# Patient Record
Sex: MALE | Race: WHITE | NOT HISPANIC OR LATINO | URBAN - METROPOLITAN AREA
[De-identification: names, ages, dates, MRNs, and addresses within clinical notes are randomized per-mention and may not be internally consistent; named-entity substitution may affect disease eponyms.]

---

## 2018-12-06 ENCOUNTER — TRANSFERRED RECORDS (OUTPATIENT)
Dept: HEALTH INFORMATION MANAGEMENT | Facility: CLINIC | Age: 28
End: 2018-12-06

## 2018-12-06 DIAGNOSIS — H33.059: Primary | ICD-10-CM

## 2018-12-08 ENCOUNTER — ANESTHESIA EVENT (OUTPATIENT)
Dept: SURGERY | Facility: AMBULATORY SURGERY CENTER | Age: 28
End: 2018-12-08

## 2018-12-09 PROBLEM — H33.001 RHEGMATOGENOUS RETINAL DETACHMENT OF RIGHT EYE: Status: ACTIVE | Noted: 2018-12-09

## 2018-12-10 ENCOUNTER — ANESTHESIA (OUTPATIENT)
Dept: SURGERY | Facility: AMBULATORY SURGERY CENTER | Age: 28
End: 2018-12-10

## 2018-12-10 ENCOUNTER — HOSPITAL ENCOUNTER (OUTPATIENT)
Facility: AMBULATORY SURGERY CENTER | Age: 28
End: 2018-12-10
Attending: OPHTHALMOLOGY

## 2018-12-10 VITALS
WEIGHT: 177.47 LBS | TEMPERATURE: 98.1 F | RESPIRATION RATE: 18 BRPM | OXYGEN SATURATION: 97 % | HEART RATE: 52 BPM | SYSTOLIC BLOOD PRESSURE: 112 MMHG | BODY MASS INDEX: 24.04 KG/M2 | HEIGHT: 72 IN | DIASTOLIC BLOOD PRESSURE: 50 MMHG

## 2018-12-10 DIAGNOSIS — H33.001 RHEGMATOGENOUS RETINAL DETACHMENT OF RIGHT EYE: ICD-10-CM

## 2018-12-10 DIAGNOSIS — Z86.69 H/O RETINAL DETACHMENT: Primary | ICD-10-CM

## 2018-12-10 DEVICE — EYE IMP BAND 2.5MM STYLE 240 S2987: Type: IMPLANTABLE DEVICE | Site: EYE | Status: FUNCTIONAL

## 2018-12-10 DEVICE — IMPLANTABLE DEVICE: Type: IMPLANTABLE DEVICE | Site: EYE | Status: FUNCTIONAL

## 2018-12-10 RX ORDER — ONDANSETRON 2 MG/ML
4 INJECTION INTRAMUSCULAR; INTRAVENOUS EVERY 30 MIN PRN
Status: DISCONTINUED | OUTPATIENT
Start: 2018-12-10 | End: 2018-12-11 | Stop reason: HOSPADM

## 2018-12-10 RX ORDER — PHENYLEPHRINE HYDROCHLORIDE 25 MG/ML
1 SOLUTION/ DROPS OPHTHALMIC
Status: COMPLETED | OUTPATIENT
Start: 2018-12-10 | End: 2018-12-10

## 2018-12-10 RX ORDER — ATROPINE SULFATE 10 MG/ML
SOLUTION/ DROPS OPHTHALMIC PRN
Status: DISCONTINUED | OUTPATIENT
Start: 2018-12-10 | End: 2018-12-10 | Stop reason: HOSPADM

## 2018-12-10 RX ORDER — PROPOFOL 10 MG/ML
INJECTION, EMULSION INTRAVENOUS PRN
Status: DISCONTINUED | OUTPATIENT
Start: 2018-12-10 | End: 2018-12-10

## 2018-12-10 RX ORDER — PROPARACAINE HYDROCHLORIDE 5 MG/ML
1 SOLUTION/ DROPS OPHTHALMIC ONCE
Status: COMPLETED | OUTPATIENT
Start: 2018-12-10 | End: 2018-12-10

## 2018-12-10 RX ORDER — SODIUM CHLORIDE, SODIUM LACTATE, POTASSIUM CHLORIDE, CALCIUM CHLORIDE 600; 310; 30; 20 MG/100ML; MG/100ML; MG/100ML; MG/100ML
INJECTION, SOLUTION INTRAVENOUS CONTINUOUS
Status: DISCONTINUED | OUTPATIENT
Start: 2018-12-10 | End: 2018-12-11 | Stop reason: HOSPADM

## 2018-12-10 RX ORDER — GABAPENTIN 300 MG/1
300 CAPSULE ORAL ONCE
Status: COMPLETED | OUTPATIENT
Start: 2018-12-10 | End: 2018-12-10

## 2018-12-10 RX ORDER — DEXAMETHASONE SODIUM PHOSPHATE 4 MG/ML
INJECTION, SOLUTION INTRA-ARTICULAR; INTRALESIONAL; INTRAMUSCULAR; INTRAVENOUS; SOFT TISSUE PRN
Status: DISCONTINUED | OUTPATIENT
Start: 2018-12-10 | End: 2018-12-10

## 2018-12-10 RX ORDER — MEPERIDINE HYDROCHLORIDE 25 MG/ML
12.5 INJECTION INTRAMUSCULAR; INTRAVENOUS; SUBCUTANEOUS
Status: DISCONTINUED | OUTPATIENT
Start: 2018-12-10 | End: 2018-12-11 | Stop reason: HOSPADM

## 2018-12-10 RX ORDER — DEXAMETHASONE SODIUM PHOSPHATE 4 MG/ML
INJECTION, SOLUTION INTRA-ARTICULAR; INTRALESIONAL; INTRAMUSCULAR; INTRAVENOUS; SOFT TISSUE PRN
Status: DISCONTINUED | OUTPATIENT
Start: 2018-12-10 | End: 2018-12-10 | Stop reason: HOSPADM

## 2018-12-10 RX ORDER — CYCLOPENTOLATE HYDROCHLORIDE 10 MG/ML
1 SOLUTION/ DROPS OPHTHALMIC
Status: DISCONTINUED | OUTPATIENT
Start: 2018-12-10 | End: 2018-12-10 | Stop reason: HOSPADM

## 2018-12-10 RX ORDER — NEOMYCIN POLYMYXIN B SULFATES AND DEXAMETHASONE 3.5; 10000; 1 MG/ML; [USP'U]/ML; MG/ML
1 SUSPENSION/ DROPS OPHTHALMIC 4 TIMES DAILY
Qty: 5 ML | Refills: 0 | Status: SHIPPED | OUTPATIENT
Start: 2018-12-10 | End: 2019-08-01

## 2018-12-10 RX ORDER — ONDANSETRON 4 MG/1
4 TABLET, ORALLY DISINTEGRATING ORAL EVERY 30 MIN PRN
Status: DISCONTINUED | OUTPATIENT
Start: 2018-12-10 | End: 2018-12-11 | Stop reason: HOSPADM

## 2018-12-10 RX ORDER — TROPICAMIDE 10 MG/ML
1 SOLUTION/ DROPS OPHTHALMIC
Status: COMPLETED | OUTPATIENT
Start: 2018-12-10 | End: 2018-12-10

## 2018-12-10 RX ORDER — TROPICAMIDE 10 MG/ML
1 SOLUTION/ DROPS OPHTHALMIC
Status: DISCONTINUED | OUTPATIENT
Start: 2018-12-10 | End: 2018-12-10 | Stop reason: HOSPADM

## 2018-12-10 RX ORDER — SODIUM CHLORIDE, SODIUM LACTATE, POTASSIUM CHLORIDE, CALCIUM CHLORIDE 600; 310; 30; 20 MG/100ML; MG/100ML; MG/100ML; MG/100ML
INJECTION, SOLUTION INTRAVENOUS CONTINUOUS
Status: DISCONTINUED | OUTPATIENT
Start: 2018-12-10 | End: 2018-12-10 | Stop reason: HOSPADM

## 2018-12-10 RX ORDER — CYCLOPENTOLATE HYDROCHLORIDE 10 MG/ML
1 SOLUTION/ DROPS OPHTHALMIC
Status: COMPLETED | OUTPATIENT
Start: 2018-12-10 | End: 2018-12-10

## 2018-12-10 RX ORDER — PROPOFOL 10 MG/ML
INJECTION, EMULSION INTRAVENOUS CONTINUOUS PRN
Status: DISCONTINUED | OUTPATIENT
Start: 2018-12-10 | End: 2018-12-10

## 2018-12-10 RX ORDER — EPHEDRINE SULFATE 50 MG/ML
INJECTION, SOLUTION INTRAMUSCULAR; INTRAVENOUS; SUBCUTANEOUS PRN
Status: DISCONTINUED | OUTPATIENT
Start: 2018-12-10 | End: 2018-12-10

## 2018-12-10 RX ORDER — POLYMYXIN B SULFATE AND TRIMETHOPRIM 1; 10000 MG/ML; [USP'U]/ML
SOLUTION OPHTHALMIC PRN
Status: DISCONTINUED | OUTPATIENT
Start: 2018-12-10 | End: 2018-12-10 | Stop reason: HOSPADM

## 2018-12-10 RX ORDER — FENTANYL CITRATE 50 UG/ML
INJECTION, SOLUTION INTRAMUSCULAR; INTRAVENOUS PRN
Status: DISCONTINUED | OUTPATIENT
Start: 2018-12-10 | End: 2018-12-10

## 2018-12-10 RX ORDER — BALANCED SALT SOLUTION 6.4; .75; .48; .3; 3.9; 1.7 MG/ML; MG/ML; MG/ML; MG/ML; MG/ML; MG/ML
SOLUTION OPHTHALMIC PRN
Status: DISCONTINUED | OUTPATIENT
Start: 2018-12-10 | End: 2018-12-10 | Stop reason: HOSPADM

## 2018-12-10 RX ORDER — PHENYLEPHRINE HYDROCHLORIDE 25 MG/ML
1 SOLUTION/ DROPS OPHTHALMIC
Status: DISCONTINUED | OUTPATIENT
Start: 2018-12-10 | End: 2018-12-10 | Stop reason: HOSPADM

## 2018-12-10 RX ORDER — OXYCODONE HYDROCHLORIDE 5 MG/1
5 TABLET ORAL EVERY 4 HOURS PRN
Status: DISCONTINUED | OUTPATIENT
Start: 2018-12-10 | End: 2018-12-11 | Stop reason: HOSPADM

## 2018-12-10 RX ORDER — ONDANSETRON 2 MG/ML
INJECTION INTRAMUSCULAR; INTRAVENOUS PRN
Status: DISCONTINUED | OUTPATIENT
Start: 2018-12-10 | End: 2018-12-10

## 2018-12-10 RX ORDER — LIDOCAINE HYDROCHLORIDE 20 MG/ML
INJECTION, SOLUTION INFILTRATION; PERINEURAL PRN
Status: DISCONTINUED | OUTPATIENT
Start: 2018-12-10 | End: 2018-12-10

## 2018-12-10 RX ORDER — ACETAMINOPHEN 325 MG/1
975 TABLET ORAL ONCE
Status: COMPLETED | OUTPATIENT
Start: 2018-12-10 | End: 2018-12-10

## 2018-12-10 RX ORDER — FENTANYL CITRATE 50 UG/ML
25-50 INJECTION, SOLUTION INTRAMUSCULAR; INTRAVENOUS
Status: DISCONTINUED | OUTPATIENT
Start: 2018-12-10 | End: 2018-12-10 | Stop reason: HOSPADM

## 2018-12-10 RX ORDER — NALOXONE HYDROCHLORIDE 0.4 MG/ML
.1-.4 INJECTION, SOLUTION INTRAMUSCULAR; INTRAVENOUS; SUBCUTANEOUS
Status: DISCONTINUED | OUTPATIENT
Start: 2018-12-10 | End: 2018-12-11 | Stop reason: HOSPADM

## 2018-12-10 RX ORDER — HYDROMORPHONE HYDROCHLORIDE 1 MG/ML
.3-.5 INJECTION, SOLUTION INTRAMUSCULAR; INTRAVENOUS; SUBCUTANEOUS EVERY 10 MIN PRN
Status: DISCONTINUED | OUTPATIENT
Start: 2018-12-10 | End: 2018-12-11 | Stop reason: HOSPADM

## 2018-12-10 RX ADMIN — PHENYLEPHRINE HYDROCHLORIDE 1 DROP: 25 SOLUTION/ DROPS OPHTHALMIC at 08:20

## 2018-12-10 RX ADMIN — SODIUM CHLORIDE, SODIUM LACTATE, POTASSIUM CHLORIDE, CALCIUM CHLORIDE: 600; 310; 30; 20 INJECTION, SOLUTION INTRAVENOUS at 09:59

## 2018-12-10 RX ADMIN — TROPICAMIDE 1 DROP: 10 SOLUTION/ DROPS OPHTHALMIC at 08:40

## 2018-12-10 RX ADMIN — DEXAMETHASONE SODIUM PHOSPHATE 4 MG: 4 INJECTION, SOLUTION INTRA-ARTICULAR; INTRALESIONAL; INTRAMUSCULAR; INTRAVENOUS; SOFT TISSUE at 10:10

## 2018-12-10 RX ADMIN — PHENYLEPHRINE HYDROCHLORIDE 1 DROP: 25 SOLUTION/ DROPS OPHTHALMIC at 08:40

## 2018-12-10 RX ADMIN — PROPOFOL 200 MCG/KG/MIN: 10 INJECTION, EMULSION INTRAVENOUS at 10:06

## 2018-12-10 RX ADMIN — PHENYLEPHRINE HYDROCHLORIDE 1 DROP: 25 SOLUTION/ DROPS OPHTHALMIC at 08:28

## 2018-12-10 RX ADMIN — CYCLOPENTOLATE HYDROCHLORIDE 1 DROP: 10 SOLUTION/ DROPS OPHTHALMIC at 08:27

## 2018-12-10 RX ADMIN — PROPARACAINE HYDROCHLORIDE 1 DROP: 5 SOLUTION/ DROPS OPHTHALMIC at 08:20

## 2018-12-10 RX ADMIN — ONDANSETRON 4 MG: 2 INJECTION INTRAMUSCULAR; INTRAVENOUS at 10:10

## 2018-12-10 RX ADMIN — PROPOFOL 200 MG: 10 INJECTION, EMULSION INTRAVENOUS at 10:06

## 2018-12-10 RX ADMIN — GABAPENTIN 300 MG: 300 CAPSULE ORAL at 08:32

## 2018-12-10 RX ADMIN — SODIUM CHLORIDE, SODIUM LACTATE, POTASSIUM CHLORIDE, CALCIUM CHLORIDE: 600; 310; 30; 20 INJECTION, SOLUTION INTRAVENOUS at 12:40

## 2018-12-10 RX ADMIN — LIDOCAINE HYDROCHLORIDE 80 MG: 20 INJECTION, SOLUTION INFILTRATION; PERINEURAL at 10:06

## 2018-12-10 RX ADMIN — SODIUM CHLORIDE, SODIUM LACTATE, POTASSIUM CHLORIDE, CALCIUM CHLORIDE: 600; 310; 30; 20 INJECTION, SOLUTION INTRAVENOUS at 08:32

## 2018-12-10 RX ADMIN — PHENYLEPHRINE HYDROCHLORIDE 1 DROP: 25 SOLUTION/ DROPS OPHTHALMIC at 08:30

## 2018-12-10 RX ADMIN — TROPICAMIDE 1 DROP: 10 SOLUTION/ DROPS OPHTHALMIC at 08:20

## 2018-12-10 RX ADMIN — CYCLOPENTOLATE HYDROCHLORIDE 1 DROP: 10 SOLUTION/ DROPS OPHTHALMIC at 08:20

## 2018-12-10 RX ADMIN — FENTANYL CITRATE 50 MCG: 50 INJECTION, SOLUTION INTRAMUSCULAR; INTRAVENOUS at 10:25

## 2018-12-10 RX ADMIN — CYCLOPENTOLATE HYDROCHLORIDE 1 DROP: 10 SOLUTION/ DROPS OPHTHALMIC at 08:40

## 2018-12-10 RX ADMIN — ACETAMINOPHEN 975 MG: 325 TABLET ORAL at 08:32

## 2018-12-10 RX ADMIN — TROPICAMIDE 1 DROP: 10 SOLUTION/ DROPS OPHTHALMIC at 08:30

## 2018-12-10 RX ADMIN — EPHEDRINE SULFATE 5 MG: 50 INJECTION, SOLUTION INTRAMUSCULAR; INTRAVENOUS; SUBCUTANEOUS at 10:55

## 2018-12-10 RX ADMIN — PROPARACAINE HYDROCHLORIDE 1 DROP: 5 SOLUTION/ DROPS OPHTHALMIC at 08:00

## 2018-12-10 RX ADMIN — TROPICAMIDE 1 DROP: 10 SOLUTION/ DROPS OPHTHALMIC at 08:28

## 2018-12-10 RX ADMIN — FENTANYL CITRATE 50 MCG: 50 INJECTION, SOLUTION INTRAMUSCULAR; INTRAVENOUS at 10:03

## 2018-12-10 RX ADMIN — CYCLOPENTOLATE HYDROCHLORIDE 1 DROP: 10 SOLUTION/ DROPS OPHTHALMIC at 08:30

## 2018-12-10 ASSESSMENT — MIFFLIN-ST. JEOR: SCORE: 1826.87

## 2018-12-10 NOTE — DISCHARGE INSTRUCTIONS
[facility]  Home Care Following Anesthesia  For 24 hours after surgery:  1. Get plenty of rest.  A responsible adult must stay with you for at least 24 hours after you leave the surgery center.  2. Do not drive or use heavy equipment.  If you have weakness or tingling, don't drive or use heavy equipment until this feeling goes away.   3. Do not drink alcohol.   4. Avoid strenuous or risky activities.  Ask for help when climbing stairs.  5. You may feel lightheaded.  IF so, sit for a few minutes before standing.  Have someone help you get up.   6. If you have nausea (feel sick to your stomach): Drink only clear liquids such as apple juice, ginger ale, broth or 7-Up.  Rest may also help.  Be sure to drink enough fluids.  Move to a regular diet as you feel able.   7. You may have a slight fever.  Call the doctor if your fever is over 100 F (37.7 C) (taken under the tongue) or lasts longer than 24 hours.  8. You may have a dry mouth, a sore throat, muscle aches or trouble sleeping. These should go away after 24 hours.  9. Do not make important or legal decisions.               Tips for taking pain medications  To get the best pain relief possible, remember these points:    Take pain medications as directed, before pain becomes severe.    Pain medication can upset your stomach: taking it with food may help.    Constipation is a common side effect of pain medication. Drink plenty of  fluids.    Eat foods high in fiber. Take a stool softener if recommended by your doctor or pharmacist.    Do not drink alcohol, drive or operate machinery while taking pain medications.    Ask about other ways to control pain, such as with heat, ice or relaxation.    Tylenol/Acetaminophen Consumption  To help encourage the safe use of acetaminophen, the makers of TYLENOL  have lowered the maximum daily dose for single-ingredient Extra Strength TYLENOL  (acetaminophen) products sold in the [place] from 8  pills per day (4,000 mg) to 6 pills per day (3,000 mg). The dosing interval has also changed from 2 pills every 4-6 hours to 2 pills every 6 hours.    If you feel your pain relief is insufficient, you may take Tylenol/Acetaminophen in addition to your narcotic pain medication.     Be careful not to exceed 3,000 mg of Tylenol/Acetaminophen in a 24 hour period from all sources.    If you are taking extra strength Tylenol/acetaminophen (500 mg), the maximum dose is 6 tablets in 24 hours.    If you are taking regular strength acetaminophen (325 mg), the maximum dose is 9 tablets in 24 hours.    Call a doctor for any of the followin. Signs of infection (fever, growing tenderness at the surgery site, a large amount of drainage or bleeding, severe pain, foul-smelling drainage, redness, swelling).  2. It has been over 8 to 10 hours since surgery and you are still not able to urinate (pass water).  3. Headache for over 24 hours.  4. Numbness, tingling or weakness the day after surgery (if you had spinal anesthesia).  Your doctor is:       Dr. Nicol Chavarria, Ophthalmology: [phone]               Or dial [phone] and ask for the resident on call for:  Ophthalmology  For emergency care, call the:  [place] [facility]:  [phone] (TTY for hearing impaired: [phone])  \[facility]  [phone]  Post Operative Eye Surgery Instructions    MD Nicol Murillo MD  Will I have pain?  Some discomfort is normal and expected following surgery. The first few days after surgery you may need to use prescription pain pills. Taking Tylenol (acetaminophen) regularly may help prevent pain.  Discomfort should gradually decrease and Tylenol should be sufficient to relieve pain. A foreign body sensation in the cornea of the eye is very common and caused by sutures placed at surgery. These sutures will go away in one to two weeks. If the pain worsens, you should call the doctor.  Do  I need to wear an eye patch?  You do not need to wear an eye patch at home after the doctor has removed the patch on your first day after surgery. However, you may be more comfortable wearing a patch outside in the sun, when sleeping or napping, or in a sarina, windy environment.  How much drainage should I have?  You may expect a moderate amount of drainage for a week. Gradually the drainage should decrease. The lids can be cleaned with a clean washcloth and gentle soap or diluted baby shampoo. Wipe the eyelids gently from the nose outward. Some blood in the tears is a normal finding.  Will there be swelling?  Some swelling is normal for about a week or two after which it will gradually decrease. Applying a cool compress, using a clean washcloth for 5 - 10 minutes several times a day may reduce the swelling and make you more comfortable. People may have some swelling of both eyes, especially if face down positioning is required. The white part of the eye may appear very red or bloody for a week or two. This may get worse a few days after surgery. Though the bright red appearance can look frightening, it is a normal finding early after surgery and will resolve in a few weeks.  Will I need to use eye drops?  You will be using several different kinds of eye drops or ointment (salve) when you leave the hospital. The directions will be on each bottle or tube. The medication with the red top will keep your eye dilated and may make your eye more sensitive to light. Wearing sunglasses may help. The other medication is a combination antibiotic/steroid to prevent infection and promote healing.  Occasionally a third drop is used to control the pressure in your eye. A new bottle of artificial tears or lubricant ointment may be used along with your prescription eye drops after surgery. You will be using drops from four to eight weeks. Bring all eye medications (drops. ointments, or pills) with you  to each visit.   Always wash  your hands before putting in the eye drops. You may wish to have someone else help you. Pull down on the lower lid and squeeze one drop from the bottle being careful not to touch the dropper to your eye or eyelid. One drop is sufficient, but another may be used if the first did not go into the eye. It is often easier to put in the drops if you are reclining or lying down. Wait five (5) minutes after the first drop before using the second drop to allow the medications to absorb into the eye.  How long will it take for my vision to improve?  Your vision should gradually improve, but it may take up to six months to regain your best vision. Frequently, air or gas bubbles are injected into the eye at the time of surgery. This will blur your vision significantly at first. As the bubble becomes smaller it will cause a black line in your vision that moves as you move your head. As the bubble becomes smaller you may notice that it looks more like a bubble or that it will break up into several smaller bubbles. It will take from a few days to a few weeks for the bubble to dissolve and be replaced by clear fluid.  You may notice floaters or double vision after your surgery. These symptoms usually will decrease with time. If the double vision is bothersome patching the eye may help.  If you notice a sudden worsening in your vision call your doctor.  Are there any physical restrictions after surgery?  If an air or gas bubble was placed in the eye during surgery, you will be asked to spend most of your time (both awake and during the night) with your head in a specific position, frequently face down. As the eye heals and the bubble dissolves there will be less of a need for you to stay in that specific position. You should avoid sleeping on your back until the bubble has totally dissolved and you have been given permission from your surgeon. You should not fly in an airplane or go to high altitudes in the mountains while there is  a bubble in your eye. If you should require any other surgery, under general anesthesia, while you still have an air bubble in your eye, have your surgeon or anesthetist contact us prior to your surgery. Some anesthetic agents can make the bubble expand and seriously damage your eye.  Patients that have a gas/air bubble placed in their eye will have a green medical alert band on their wrist.  This band should not be removed until the doctor removes it for you or gives you permission to remove it.     Heavy lifting (greater than 50 pounds), swimming and contact sports should be avoided for about 3 to 4 weeks after surgery.  You may resume your usual sexual activities about one week after surgery.  When may I return to work or my normal activities?  Depending on the type or work, you may return to work within a few days. If your work involves physical activity or driving, you will need to restrict your activities and remain home longer.  You may watch television, look at magazines, or work puzzles. Reading may be uncomfortable for several days, but using the eyes will not cause any damage.  You may go outside as usual. If conditions are windy or sarina, wear an eye pad to avoid getting dust or dirt in the eye.  Can I travel?  You cannot fly in an airplane or drive into the mountains as long as the air or gas bubble remains in your eye.    Are there any driving restrictions?  Someone will need to drive you home from the hospital. Generally driving can be resumed in several days if you have good vision in your other eye. If you do not feel comfortable driving, do not drive! Your depth perception will be decreased so you will want to try driving during the day in light traffic until you feel comfortable driving. You should restrict your driving while you are taking prescription pain pills as they also can affect your judgment.  When can I shower and wash my hair?  You may shower or bathe when you get home, but avoid  getting water in your eye. You may want someone to help you shampoo your hair at first.  You may shave, brush your teeth, or comb your hair. Do not use make-up, mascara, or creams/lotions around your eye for several weeks.  When will I see the doctor again?  Generally, you will be seen the first day after surgery and again 1-2 weeks later. If you have not received a return appointment before leaving the hospital, you should call our office during the business hours to arrange an appointment. If you will be seeing your local doctor instead of us, you will need to call that office to set up an appointment.    If you have a green armband on, your physician will instruct you as to how long you will have to wear it at your post-operative follow-up appointment.  How do I reach a doctor if I have concerns?  One of our doctors is available by calling [facility] [phone]. Please try to call for routine questions and prescription refills during business hours.  You should call your doctor if:  You notice a sudden decrease in your vision.  Have severe pain or pain increases rather than subsiding.  You notice a new black curtain over your eye that is not the gas bubble. If you have any of these symptoms, you may need to be examined.

## 2018-12-10 NOTE — ANESTHESIA POSTPROCEDURE EVALUATION
Anesthesia POST Procedure Evaluation    Patient: Abel Nuñez   MRN:     [id number] Gender:   male   Age:    28 year old :      1990        Preoperative Diagnosis: Retinal Detachment   Procedure(s):  Right Eye Scleral Buckle, cryo, subretinal drainage  Left Retinopexy photocoagulation with binocular indirect ophthalmoscope   Postop Comments: No value filed.       Anesthesia Type:  General    Reportable Event: NO     PAIN: Uncomplicated   Sign Out status: Comfortable, Well controlled pain     PONV: No PONV   Sign Out status:  No Nausea or Vomiting     Neuro/Psych: Uneventful perioperative course   Sign Out Status: Preoperative baseline; Age appropriate mentation     Airway/Resp.: Uneventful perioperative course   Sign Out Status: Non labored breathing, age appropriate RR; Resp. Status within EXPECTED Parameters     CV: Uneventful perioperative course   Sign Out status: Appropriate BP and perfusion indices; Appropriate HR/Rhythm     Disposition:   Sign Out in:  PACU  Disposition:  Phase II; Home  Recovery Course: Uneventful  Follow-Up: Not required           Last Anesthesia Record Vitals:  CRNA VITALS  12/10/2018 1221 - 12/10/2018 1321      12/10/2018             Pulse:  39  (Abnormal)     SpO2:  98 %    Resp Rate (observed):  12          Last PACU/Preop Vitals:  Vitals:    12/10/18 1256 12/10/18 1311 12/10/18 1323   BP: 105/46 96/59 101/48   Pulse: 60 62 57   Resp: 24 15 22   Temp: 37.1  C (98.7  F) 37  C (98.6  F) 36.9  C (98.4  F)   SpO2: 99% 97% 97%         Electronically Signed By: Eliz Adams MD, December 10, 2018, 1:28 PM

## 2018-12-10 NOTE — OP NOTE
SURGEON: STELLA FLOREZ MD  ASSISTANT SURGEON: Haris Owen MD  PREOPERATIVE DIAGNOSIS: Retinal detachment right eye  POSTOPERATIVE DIAGNOSIS: same   NAME OF THE PROCEDURE: Right eye scleral buckle (240/276/70), Cryo, subretinal fluid drainage  Left eye indirect laser ophthalmoscopy   ANESTHESIA: General anesthesia and Retrobulbar block  COMPLICATIONS: none   INDICATIONS: Abel Nuñez is a 28 year old patient with diagnosis of inferior Retinal detachment, macula off.  DESCRIPTION OF THE PROCEDURE   The patient was brought into the OR where general anesthesia was given by the anesthesia department.     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 The decision was to proceed with prophylactic retinopexy to the peripheral lesions.     After the laser was performed then the right eye was then prepped and draped in the usual fashion for ophthalmic surgery, including the installation of one drop of povidone iodine.   A 360 degree conjunctival peritomy was created and the quadrants cleared with the quinteros scissors. The muscles were isolated and looped with 2-0 silk sutures. A 240 band previously soak in polymyxin solution was placed under each of the rectus muscles. In addition, a 276 element was placed under the band from  3 to 9 clock hours. Next 5-0 merseline horizontal mattress sutures were placed in each quadrants to secure the band. Two sutures were placed in infratemporal and infranasal quadrants. A #70 sleeve was used to secure the band in the superonasal quadrant. The superior nasal mattress sutures was left untied.   Next, the highest point of the inferior Retinal detachment was marked with ink at 6 o'clock temporal and posterior to the insertion of the inferior rectus and a sclerotomy was performed using a 69 blade. The choroid was cauterized with diathermia and the subretinal fluid was drained. Next, the sclerotomy was closed with one  single 7-0 vicryl suture.  Next, cryotherapy was applied to the inferior breaks. the scleral buckle was tighten and the eye was checked. There was good perfusion of the optic nerve. The scleral buckle had good contour and the retina was attached. There was mild posterior remaining subretinal fluid.  The pressure was checked and verified to be appropriate.   The buckle was rinsed with neosporine solution and the muscle silk sutures removed.   A peribulbar block consistent of a 1:1 mixtures of 2% Lidocaine and 0.75 % of marcaine with epinephrine and wydase was administered.  The conjunctiva was closed with 6-0 plain suture. Subconjunctival injections of ancef and dexamethasone were administered. The lid speculum was removed. The eye was cleaned with wet and dry gauze. A drop of atropine and maxitrol ointment was placed in the eye. An eye pad and dorsey shield were taped over the eye.   The patient tolerated well the procedure and was discharge to the post-operative unit in stable conditions with no complications.    The surgery was assisted by Dr. Owen. Due to the delicate and complex nature of this surgery, Dr. Owen was required. Dr. Owen assisted with scleral depression. I was present for the entire surgery.

## 2018-12-10 NOTE — ANESTHESIA PREPROCEDURE EVALUATION
Anesthesia Pre-Procedure Evaluation    Patient: Abel Nuñez   MRN:     [id number] Gender:   male   Age:    28 year old :      1990        Preoperative Diagnosis: Retinal Detachment   Procedure(s):  Right Eye 25 Vitrectomy, Fluid Gas Exchange, Laser, Scleral Buckle     Past Medical History:   Diagnosis Date     Uncomplicated asthma     When younger      History reviewed. No pertinent surgical history.       Anesthesia Evaluation     . Pt has not had prior anesthetic            ROS/MED HX    ENT/Pulmonary:     (+)asthma (as a child. No issues or meds for 2 decades. ) , . .    Neurologic:  - neg neurologic ROS     Cardiovascular:  - neg cardiovascular ROS       METS/Exercise Tolerance:  >4 METS   Hematologic:  - neg hematologic  ROS       Musculoskeletal:  - neg musculoskeletal ROS       GI/Hepatic:  - neg GI/hepatic ROS       Renal/Genitourinary:  - ROS Renal section negative       Endo:  - neg endo ROS       Psychiatric:         Infectious Disease:  - neg infectious disease ROS       Malignancy:      - no malignancy   Other:    - neg other ROS                     PHYSICAL EXAM:   Mental Status/Neuro: A/A/O   Airway: Facies: Feasible  Mallampati: I  Mouth/Opening: Full  TM distance: > 6 cm  Neck ROM: Full   Respiratory:    CV:    Comments:      Dental: Normal                  No results found for: WBC, HGB, HCT, PLT, CRP, SED, NA, POTASSIUM, CHLORIDE, CO2, BUN, CR, GLC, NIKOLE, PHOS, MAG, ALBUMIN, PROTTOTAL, ALT, AST, GGT, ALKPHOS, BILITOTAL, BILIDIRECT, LIPASE, AMYLASE, ANNETTE, PTT, INR, FIBR, TSH, T4, T3, HCG, HCGS, CKTOTAL, CKMB, TROPN    Preop Vitals  BP Readings from Last 3 Encounters:   12/10/18 126/77    Pulse Readings from Last 3 Encounters:   No data found for Pulse      Resp Readings from Last 3 Encounters:   12/10/18 16    SpO2 Readings from Last 3 Encounters:   No data found for SpO2      Temp Readings from Last 1 Encounters:   12/10/18 36.7  C (98.1  F) (Oral)    Ht Readings from Last 1 Encounters:  "  12/10/18 1.835 m (6' 0.24\")      Wt Readings from Last 1 Encounters:   12/10/18 80.5 kg (177 lb 7.5 oz)    Estimated body mass index is 23.91 kg/m  as calculated from the following:    Height as of this encounter: 1.835 m (6' 0.24\").    Weight as of this encounter: 80.5 kg (177 lb 7.5 oz).     LDA:            Assessment:   ASA SCORE: 2    NPO Status: > 2 hours since completed Clear Liquids; > 6 hours since completed Solid Foods   Documentation: H&P complete; Preop Testing complete; Consents complete   Proceeding: Proceed without further delay  Tobacco Use:  Active user of Tobacco     Plan:   Anes. Type:  General   Pre-Induction: Midazolam IV; Acetaminophen PO   Induction:  IV (Standard)   Airway: LMA   Access/Monitoring: PIV   Maintenance: Balanced   Emergence: Procedure Site   Logistics: Same Day Surgery     Postop Pain/Sedation Strategy:  Standard-Options: Opioids PRN     PONV Management:  Adult Risk Factors:, Postop Opioids     CONSENT: Direct conversation   Plan and risks discussed with: Patient; Mother          Comments for Plan/Consent:  Discussed risks, benefits, and alternatives of general anesthesia with the patient. Risks discussed included nausea/vomiting, sore throat, dental damage, soft tissue damage, problems with heart, brain, lungs, and rare allergic reactions. Patient was given a chance to ask questions. Patient consents to procedure.                            Eliz Adams MD  "

## 2018-12-10 NOTE — BRIEF OP NOTE
[facility] [place] Brief Operative Note    Pre-operative diagnosis: Retinal Detachment right eye    Post-operative diagnosis smae   Procedure: Procedure(s):  Right Eye Scleral Buckle, cryo, subretinal drainage  Left Retinopexy photocoagulation with binocular indirect ophthalmoscope    Surgeon(s): Surgeon(s) and Role:  Panel 1:     * Nicol Chavarria MD - Primary  Panel 2:     * Haris Owen MD - assistant   Estimated blood loss: Less than 0.5 cc    Specimens: * No specimens in log *   Findings: Retinal detachment  Right eye

## 2018-12-11 ENCOUNTER — OFFICE VISIT (OUTPATIENT)
Dept: OPHTHALMOLOGY | Facility: CLINIC | Age: 28
End: 2018-12-11
Attending: OPHTHALMOLOGY
Payer: COMMERCIAL

## 2018-12-11 DIAGNOSIS — Z48.810 AFTERCARE FOLLOWING SURGERY OF A SENSORY ORGAN: Primary | ICD-10-CM

## 2018-12-11 PROCEDURE — G0463 HOSPITAL OUTPT CLINIC VISIT: HCPCS | Mod: ZF

## 2018-12-11 RX ORDER — NEOMYCIN SULFATE, POLYMYXIN B SULFATE, AND DEXAMETHASONE 3.5; 10000; 1 MG/G; [USP'U]/G; MG/G
0.5 OINTMENT OPHTHALMIC AT BEDTIME
Qty: 1 TUBE | Refills: 0 | Status: SHIPPED | OUTPATIENT
Start: 2018-12-11 | End: 2018-12-11

## 2018-12-11 RX ORDER — ATROPINE SULFATE 10 MG/ML
1-2 SOLUTION/ DROPS OPHTHALMIC DAILY
Qty: 1 BOTTLE | Refills: 11 | Status: SHIPPED | OUTPATIENT
Start: 2018-12-11 | End: 2018-12-11

## 2018-12-11 RX ORDER — NEOMYCIN SULFATE, POLYMYXIN B SULFATE, AND DEXAMETHASONE 3.5; 10000; 1 MG/G; [USP'U]/G; MG/G
0.5 OINTMENT OPHTHALMIC AT BEDTIME
Qty: 1 TUBE | Refills: 0 | Status: SHIPPED | OUTPATIENT
Start: 2018-12-11 | End: 2019-08-01

## 2018-12-11 RX ORDER — ATROPINE SULFATE 10 MG/ML
1-2 SOLUTION/ DROPS OPHTHALMIC DAILY
Qty: 1 BOTTLE | Refills: 11 | Status: SHIPPED | OUTPATIENT
Start: 2018-12-11 | End: 2019-01-23

## 2018-12-11 ASSESSMENT — VISUAL ACUITY
METHOD: SNELLEN - LINEAR
OS_CC: 20/25
OS_CC+: -3
OD_SC: CF @ 3FT

## 2018-12-11 ASSESSMENT — SLIT LAMP EXAM - LIDS
COMMENTS: NORMAL
COMMENTS: NORMAL

## 2018-12-11 ASSESSMENT — EXTERNAL EXAM - RIGHT EYE: OD_EXAM: NORMAL

## 2018-12-11 ASSESSMENT — TONOMETRY
OD_IOP_MMHG: 12
IOP_METHOD: ICARE
OS_IOP_MMHG: 15

## 2018-12-11 ASSESSMENT — EXTERNAL EXAM - LEFT EYE: OS_EXAM: NORMAL

## 2018-12-11 NOTE — PATIENT INSTRUCTIONS
Position: any  No aviation  No heavy lifting   Viramontes shield at all times  Retina detachment and endophthalmitis precautions were discussed with the patient and was asked to return if any of the those occur     Medications to operative eye  maxitrol (pink top) four times a day    Maxitrol oint at bedtime  Atropine (red top) once a day      Follow up in one week

## 2018-12-11 NOTE — PROGRESS NOTES
Postoperative day 1 status post SB (240/276/70)/Cryo/SRF drainage OD and ABBE OS (12/10/18)  for RRD OD and lattice with atrophic holes OS      Right eye scleral buckle (240/276/70), Cryo, subretinal fluid drainage  Left eye indirect laser ophthalmoscopy      Slept OK  Retina attached  Doing well     Plan:  Position: any  No aviation  No heavy lifting   Viramontes shield at all times  Retina detachment and endophthalmitis precautions were discussed with the patient and was asked to return if any of the those occur     Medications to operative eye  maxitrol (pink top) four times a day    Maxitrol oint at bedtime  Atropine (red top) once a day      Follow up in one week    Haris Owen M.D.  PGY-3, Ophthalmology    ~~~~~~~~~~~~~~~~~~~~~~~~~~~~~~~~~~  I have not examined this patient. I have discussed the case and the management of this patient's care with the Resident/Fellow. I also have reviewed and agree with the assessment and plan as stated above and agree with all of its relevant components.    Nicol Chavarria MD   of Ophthalmology.  Retina Service   Department of Ophthalmology and Visual Neurosciences   [facility]  Phone: [phone]   Fax: [phone]

## 2018-12-11 NOTE — LETTER
December 11, 2018      Re: Abel Nuñez   1990    To Whom It May Concern:    This is to confirm that the above patient was seen on 12/11/2018.  Abel Nuñez is able to work, but needs to take it easy until his next appointment on Wed Dec 19th. If you have any questions, please call [place] at [phone].    Thank you for your cooperation in this matter.    Sincerely,      PRATIBHA FONTANA

## 2018-12-17 ENCOUNTER — TELEPHONE (OUTPATIENT)
Dept: OPHTHALMOLOGY | Facility: CLINIC | Age: 28
End: 2018-12-17

## 2018-12-17 NOTE — TELEPHONE ENCOUNTER
Pt called stating that he thinks the atropine is giving him a headache. Told to stop. Then pt stated he is being treated for a concussion. Told pt to check with the MD to make sure not related. Pt has an appt tomorrow with him.  Yvette Poon COT 11:34 AM December 17, 2018

## 2018-12-26 ENCOUNTER — TELEPHONE (OUTPATIENT)
Dept: OPHTHALMOLOGY | Facility: CLINIC | Age: 28
End: 2018-12-26

## 2018-12-26 NOTE — TELEPHONE ENCOUNTER
[facility] Call Center    Phone Message    May a detailed message be left on voicemail: yes    Reason for Call: Other: per pt- stated he is out of refills on the neomycin-polymyxin-dexamethasone (MAXITROL) [id number] ophthalmic medication- he stated it was a drop- but i only see ointment in his file- he would like to know if he should continue med- and if so he would like another refill thanks     Action Taken: Message routed to:  [facility] ([facility] eye clinic

## 2018-12-26 NOTE — TELEPHONE ENCOUNTER
Pt last seen one day post-op 12-11-18 and did not f/u one week post op    Note to dr. viramontes to review eye drop regimen course now 2 weeks out  Malcom Adames RN 5:32 PM 12/26/18

## 2018-12-28 NOTE — TELEPHONE ENCOUNTER
Spoke to pt about recommendations for eye drops post-op in setting of no f/u since day one post-op.    Asked pt if has had f/u post-op locally and pt had one week post-op with dr. Pinedo and has upcoming appt January 8th    Pt states also contacted dr. Pinedo's office and they did review the eye drop regimen per dr. Pinedo with pt    Pt continuing with maxitrol 4/day and ointment until f/u  Pt has stopped atropine    Reviewed may continue eye drops per Dr. Pinedo whom is following pt postoperatively   Malcom Adames RN 10:10 AM 12/28/18

## 2019-01-16 ENCOUNTER — TRANSFERRED RECORDS (OUTPATIENT)
Dept: HEALTH INFORMATION MANAGEMENT | Facility: CLINIC | Age: 29
End: 2019-01-16

## 2019-01-17 ENCOUNTER — TELEPHONE (OUTPATIENT)
Dept: OPHTHALMOLOGY | Facility: CLINIC | Age: 29
End: 2019-01-17

## 2019-01-17 NOTE — TELEPHONE ENCOUNTER
Pt seen by Dr. Pinedo yesterday for post-op retina detachment repair right eye 12-10-19  Significant subretina fluid note on exam yesterday and recommends f/u with dr. frost within one wekk    Pt started on prednisolone drops 4/day right eye  Oral prednisone 20 mg started     Scheduled pt next weds with dr. frost-- pt aware of date/time    Note to dr. Baxter/facilitator for review and review of notes being faxed  If amendment to plan advised will need to contact pt  Malcom Adames RN 8:26 AM 01/17/19

## 2019-01-23 ENCOUNTER — OFFICE VISIT (OUTPATIENT)
Dept: OPHTHALMOLOGY | Facility: CLINIC | Age: 29
End: 2019-01-23
Attending: OPHTHALMOLOGY
Payer: COMMERCIAL

## 2019-01-23 DIAGNOSIS — H33.21 RETINAL DETACHMENT WITH PRESENCE OF SUBRETINAL FLUID, RIGHT: ICD-10-CM

## 2019-01-23 DIAGNOSIS — H33.21 RETINAL DETACHMENT WITH PRESENCE OF SUBRETINAL FLUID, RIGHT: Primary | ICD-10-CM

## 2019-01-23 DIAGNOSIS — Z48.810 AFTERCARE FOLLOWING SURGERY OF A SENSORY ORGAN: ICD-10-CM

## 2019-01-23 PROCEDURE — G0463 HOSPITAL OUTPT CLINIC VISIT: HCPCS | Mod: ZF

## 2019-01-23 PROCEDURE — 92134 CPTRZ OPH DX IMG PST SGM RTA: CPT | Mod: ZF | Performed by: OPHTHALMOLOGY

## 2019-01-23 PROCEDURE — 92250 FUNDUS PHOTOGRAPHY W/I&R: CPT | Mod: ZF | Performed by: OPHTHALMOLOGY

## 2019-01-23 ASSESSMENT — VISUAL ACUITY
OD_PH_CC+: -2
OD_CC: 20/300
CORRECTION_TYPE: GLASSES
OS_CC: 20/20
METHOD: SNELLEN - LINEAR
OD_PH_CC: 20/50

## 2019-01-23 ASSESSMENT — REFRACTION_WEARINGRX
OS_SPHERE: -9.75
OS_CYLINDER: +1.25
OD_AXIS: 083
SPECS_TYPE: SVL
OD_SPHERE: -10.00
OD_CYLINDER: +2.75
OS_AXIS: 095

## 2019-01-23 ASSESSMENT — EXTERNAL EXAM - LEFT EYE: OS_EXAM: NORMAL

## 2019-01-23 ASSESSMENT — EXTERNAL EXAM - RIGHT EYE: OD_EXAM: NORMAL

## 2019-01-23 ASSESSMENT — CONF VISUAL FIELD
OD_NORMAL: 1
OS_NORMAL: 1

## 2019-01-23 ASSESSMENT — SLIT LAMP EXAM - LIDS
COMMENTS: NORMAL
COMMENTS: NORMAL

## 2019-01-23 ASSESSMENT — TONOMETRY
OD_IOP_MMHG: 24
OS_IOP_MMHG: 16
IOP_METHOD: TONOPEN

## 2019-01-23 NOTE — NURSING NOTE
Chief Complaints and History of Present Illnesses   Patient presents with     Post Op (Ophthalmology) Right Eye     Chief Complaint(s) and History of Present Illness(es)     Post Op (Ophthalmology) Right Eye     Laterality: right eye              Comments     status post SB (240/276/70)/Cryo/SRF drainage OD   Pt. States that VA has been improving RE.  No flashes or floaters RE.  No pain BE.  Vandana Atkinson COT 1:10 PM January 23, 2019

## 2019-01-23 NOTE — PROGRESS NOTES
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 Seen by Dr Pinedo and felt to have increased subretinal fluid  Right eye       Right eye scleral buckle (240/276/70), Cryo, subretinal fluid drainage  Left eye indirect laser ophthalmoscopy      Retina attached  Doing well    OCULAR IMAGING  Optical Coherence Tomography 1-23-19  Right eye: subfoveal subretinal fluid    Extramacular: shallow SRF  Left eye: good foveal contour; small cystic change    PHOTOS 1-23-19  Right eye: consistent with exam. scleral buckle good height; peripheral laser scars    Autofluorescence 01/23/19 : inferior areas of hypoautofluorescence; inf demarcation line hyperautofluorescent    Plan:  Retina detachment and endophthalmitis precautions were discussed with the patient and was asked to return if any of the those occur     Medications to operative eye  Prednisone three times a day right eye x 1 week, then twice a day x 1 week then once a day x 1 week and stop   Maxitrol oint at bedtime until finish  Atropine (red top) once a day - stop     Follow up in 4-6 weeks with Optical Coherence Tomography and inferior and temporal extramacular Optical Coherence Tomography images.   And prescription next follow up   ~~~~~~~~~~~~~~~~~~~~~~~~~~~~~~~~~~   Complete documentation of historical and exam elements from today's encounter can be found in the full encounter summary report (not reduplicated in this progress note).  I personally obtained the chief complaint(s) and history of present illness.  I confirmed and edited as necessary the review of systems, past medical/surgical history, family history, social history, and examination findings as documented by others; and I examined the patient myself.  I personally reviewed the relevant tests, images, and reports as documented above.  I formulated and edited as necessary the assessment and plan and discussed  the findings and management plan with the patient and family    Nicol Chavarria MD   of Ophthalmology.  Retina Service   Department of Ophthalmology and Visual Neurosciences   [facility]  Phone: [phone]   Fax: [phone]

## 2019-02-15 ENCOUNTER — TELEPHONE (OUTPATIENT)
Dept: OPHTHALMOLOGY | Facility: CLINIC | Age: 29
End: 2019-02-15

## 2019-02-15 NOTE — TELEPHONE ENCOUNTER
Pt calling triage line to review questions with Dr. Chavarria    S/p scleral buckle/cryo for retina detachment right eye 12-10-18    1. Ok to use tylenol per pt?--  Reviewed no contraindication per ophthalmology-- stay under 4 grams/day less if been told by PCP/healthcare profession in past. Pt states using for sinus congestion/illness      2. Pt has question if can hold on upcoming appointment with dr. Pinedo and f/u with dr. Chavarria on 3-7-19 as scheduled-- reviewed 1-23-19.  Pt to f/u in 4-6 weeks, stated if having new new vision changes/eye pain may f/u as scheduled with Dr. Chavarria on 3-7-19    Pt verbally demonstrated understanding     Note to Dr. Baxter for review and amendment if applicable  Malcom Adames RN 8:30 AM 02/15/19

## 2019-03-07 ENCOUNTER — OFFICE VISIT (OUTPATIENT)
Dept: OPHTHALMOLOGY | Facility: CLINIC | Age: 29
End: 2019-03-07
Attending: OPHTHALMOLOGY
Payer: COMMERCIAL

## 2019-03-07 DIAGNOSIS — H33.051 RECENT RETINAL DETACHMENT OF RIGHT EYE, TOTAL/SUBTOTAL: Primary | ICD-10-CM

## 2019-03-07 PROCEDURE — 92134 CPTRZ OPH DX IMG PST SGM RTA: CPT | Mod: ZF | Performed by: OPHTHALMOLOGY

## 2019-03-07 PROCEDURE — G0463 HOSPITAL OUTPT CLINIC VISIT: HCPCS | Mod: ZF

## 2019-03-07 PROCEDURE — 92015 DETERMINE REFRACTIVE STATE: CPT | Mod: ZF

## 2019-03-07 ASSESSMENT — REFRACTION_WEARINGRX
OS_CYLINDER: +1.25
OS_SPHERE: -9.75
SPECS_TYPE: SVL
OD_SPHERE: -10.00
OD_AXIS: 083
OS_AXIS: 095
OD_CYLINDER: +2.75

## 2019-03-07 ASSESSMENT — CONF VISUAL FIELD
METHOD: COUNTING FINGERS
OS_NORMAL: 1
OD_NORMAL: 1

## 2019-03-07 ASSESSMENT — VISUAL ACUITY
CORRECTION_TYPE: GLASSES
OD_PH_CC: 20/70
OD_CC: 20/250
OS_CC: 20/20
METHOD: SNELLEN - LINEAR
OS_CC+: -1

## 2019-03-07 ASSESSMENT — EXTERNAL EXAM - LEFT EYE: OS_EXAM: NORMAL

## 2019-03-07 ASSESSMENT — TONOMETRY
OS_IOP_MMHG: 20
OD_IOP_MMHG: 15
IOP_METHOD: TONOPEN

## 2019-03-07 ASSESSMENT — REFRACTION_MANIFEST
OS_CYLINDER: +1.25
OD_CYLINDER: +2.50
OS_SPHERE: -10.75
OD_SPHERE: -14.00
OD_AXIS: 095
OS_AXIS: 095

## 2019-03-07 ASSESSMENT — SLIT LAMP EXAM - LIDS
COMMENTS: NORMAL
COMMENTS: NORMAL

## 2019-03-07 ASSESSMENT — EXTERNAL EXAM - RIGHT EYE: OD_EXAM: NORMAL

## 2019-03-07 NOTE — LETTER
3/7/2019       RE: Abel Nuñez  [address]  [facility] [address]     Dear Todd,    Thank you for referring your patient, Abel Nuñez, to the EYE CLINIC at [facility]. Please see a copy of my visit note below.    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 Feels that vision is improving but slowly.    Retina attached  Doing well    OCULAR IMAGING  Optical Coherence Tomography  3-7-19  Right eye: subfoveal subretinal fluid, appears stable from 1/23   Extramacular: shallow subretinal fluid, appears to be improving  Left eye: good foveal contour; small cystic change    PHOTOS 1-23-19  Right eye: consistent with exam. scleral buckle good height; peripheral laser scars    Autofluorescence 01/23/19 : inferior areas of hypoautofluorescence; inf demarcation line hyperautofluorescent    Plan:  Off of drops  VA refracts to 20/40, dispensed today  Macular subretinal fluid without change, extramacular fluid appears to be slightly better     Follow up in 8 weeks with Optical Coherence Tomography and inferior and temporal extramacular Optical Coherence Tomography images.     Sheeba Reza MD  PGY-3 Ophthalmology  Again, thank you for allowing me to participate in the care of your patient.      Sincerely,      Nicol Chavarria MD   of Ophthalmology.  Retina Service   Department of Ophthalmology and Visual Neurosciences   [facility]  Phone: [phone]   Fax: [phone]

## 2019-03-07 NOTE — PROGRESS NOTES
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 Feels that vision is improving but slowly.    Retina attached  Doing well    OCULAR IMAGING  Optical Coherence Tomography  3-7-19  Right eye: subfoveal subretinal fluid, appears stable from 1/23   Extramacular: shallow subretinal fluid, appears to be improving  Left eye: good foveal contour; small cystic change    PHOTOS 1-23-19  Right eye: consistent with exam. scleral buckle good height; peripheral laser scars    Autofluorescence 01/23/19 : inferior areas of hypoautofluorescence; inf demarcation line hyperautofluorescent    Plan:  Off of drops  VA refracts to 20/40, dispensed today  Macular subretinal fluid without change, extramacular fluid appears to be slightly better     Follow up in 8 weeks with Optical Coherence Tomography and inferior and temporal extramacular Optical Coherence Tomography images.     Sheeba Reza MD  PGY-3 Ophthalmology    ~~~~~~~~~~~~~~~~~~~~~~~~~~~~~~~~~~   Complete documentation of historical and exam elements from today's encounter can be found in the full encounter summary report (not reduplicated in this progress note).  I personally obtained the chief complaint(s) and history of present illness.  I confirmed and edited as necessary the review of systems, past medical/surgical history, family history, social history, and examination findings as documented by others; and I examined the patient myself.  I personally reviewed the relevant tests, images, and reports as documented above.  I formulated and edited as necessary the assessment and plan and discussed the findings and management plan with the patient and family    Nicol Chavarria MD   of Ophthalmology.  Retina Service   Department of Ophthalmology and Visual Neurosciences   [facility]  Phone: [phone]   Fax: [phone]

## 2019-03-07 NOTE — NURSING NOTE
Chief Complaints and History of Present Illnesses   Patient presents with     Follow Up     Chief Complaint(s) and History of Present Illness(es)     Follow Up     Laterality: both eyes    Onset: gradual    Onset: months ago    Quality: States that the va is getting a little better     Frequency: constantly    Associated symptoms: floaters (occ) and dryness (occ).  Negative for flashes, redness and tearing    Pain scale: 0/10              Comments     Nora Barry COT 12:44 PM March 7, 2019

## 2019-03-13 ENCOUNTER — TELEPHONE (OUTPATIENT)
Dept: OPHTHALMOLOGY | Facility: CLINIC | Age: 29
End: 2019-03-13

## 2019-03-13 NOTE — TELEPHONE ENCOUNTER
Left eye strain (non-surgical eye) having more strain  Works with computer during day  Pt states at visit last week Dr. Chavarria stated eye looked fine per pt     Reviewed to try preservative free artificial tears during day, use before computer work, take eye breaks.  Pt may try to update glasses (refraction done last visit) if tears not helping  If continues to have problems may come in for evaluation or may see local eye doctor in [place]    Pt seemed comfortable with plan    Note to dr. Sahil Adames RN 8:27 AM 03/14/19              [facility] Center    Phone Message    May a detailed message be left on voicemail: yes    Reason for Call: Symptoms or Concerns     If patient has red-flag symptoms, warm transfer to triage line    Current symptom or concern: left eye has been sore recently with some strain, on and off for a while    Symptoms have been present for: unknown    Has patient previously been seen for this? Yes    By :     Date: 03/7/19    Are there any new or worsening symptoms? No      Action Taken: Message routed to:  [facility] eye

## 2019-04-04 ENCOUNTER — TELEPHONE (OUTPATIENT)
Dept: OPHTHALMOLOGY | Facility: CLINIC | Age: 29
End: 2019-04-04

## 2019-04-04 NOTE — TELEPHONE ENCOUNTER
Last glasses Rx mailed to pt per request  Also provided [employer] rep number-- pt's are able to print last glasses Rx from [employer] under results  Malcom Adames RN 9:30 AM 04/04/19

## 2019-05-08 ENCOUNTER — OFFICE VISIT (OUTPATIENT)
Dept: OPHTHALMOLOGY | Facility: CLINIC | Age: 29
End: 2019-05-08
Attending: OPHTHALMOLOGY
Payer: COMMERCIAL

## 2019-05-08 DIAGNOSIS — H33.051 RECENT RETINAL DETACHMENT OF RIGHT EYE, TOTAL/SUBTOTAL: ICD-10-CM

## 2019-05-08 PROCEDURE — G0463 HOSPITAL OUTPT CLINIC VISIT: HCPCS | Mod: ZF

## 2019-05-08 PROCEDURE — 92134 CPTRZ OPH DX IMG PST SGM RTA: CPT | Mod: ZF | Performed by: OPHTHALMOLOGY

## 2019-05-08 ASSESSMENT — VISUAL ACUITY
METHOD: SNELLEN - LINEAR
OS_CC: 20/20
OD_CC: 20/250
CORRECTION_TYPE: GLASSES
OD_PH_CC: 20/60

## 2019-05-08 ASSESSMENT — EXTERNAL EXAM - RIGHT EYE: OD_EXAM: NORMAL

## 2019-05-08 ASSESSMENT — TONOMETRY
IOP_METHOD: TONOPEN
OD_IOP_MMHG: 14
OS_IOP_MMHG: 18

## 2019-05-08 ASSESSMENT — SLIT LAMP EXAM - LIDS
COMMENTS: NORMAL
COMMENTS: NORMAL

## 2019-05-08 ASSESSMENT — CONF VISUAL FIELD
OS_NORMAL: 1
OD_NORMAL: 1

## 2019-05-08 ASSESSMENT — EXTERNAL EXAM - LEFT EYE: OS_EXAM: NORMAL

## 2019-05-08 NOTE — PROGRESS NOTES
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 Feels that vision is improving but slowly.    Retina attached  Scleral buckle good contour  Doing well  VA refracted to 20/40    OCULAR IMAGING  Optical Coherence Tomography  05/08/19   Right eye: subfoveal subretinal fluid, improved  Extramacular: shallow subretinal fluid, improving  Left eye: good foveal contour; small cystic change    PHOTOS 1-23-19  Right eye: consistent with exam. scleral buckle good height; peripheral laser scars    Autofluorescence 01/23/19 : inferior areas of hypoautofluorescence; inf demarcation line hyperautofluorescent    Assessment and plan  1. History of Retinal detachment  Right eye   status post SB (240/276/70)/Cryo/SRF drainage OD and ABBE OS (12/10/18)   Retina attached  Patient did not tolerate prescription   Macular subretinal fluid improving     plan  Follow up in 12 weeks with Optical Coherence Tomography and inferior and temporal extramacular Optical Coherence Tomography images.   And prescription   artificial tears  As needed     ~~~~~~~~~~~~~~~~~~~~~~~~~~~~~~~~~~   Complete documentation of historical and exam elements from today's encounter can be found in the full encounter summary report (not reduplicated in this progress note).  I personally obtained the chief complaint(s) and history of present illness.  I confirmed and edited as necessary the review of systems, past medical/surgical history, family history, social history, and examination findings as documented by others; and I examined the patient myself.  I personally reviewed the relevant tests, images, and reports as documented above.  I formulated and edited as necessary the assessment and plan and discussed the findings and management plan with the patient and family    Nicol Chavarria MD   of Ophthalmology.  Retina Service    Department of Ophthalmology and Visual Neurosciences   [facility]  Phone: [phone]   Fax: [phone]

## 2019-05-08 NOTE — NURSING NOTE
Chief Complaints and History of Present Illnesses   Patient presents with     Retinal Detachment Follow Up     Chief Complaint(s) and History of Present Illness(es)     Retinal Detachment Follow Up     Laterality: both eyes              Comments     Pt. States that he was seen on Monday at eye clinic and told that he had eye infection.  Was prescribed Tobramycin-dexamethasone QID BE.  No change in VA BE.  No pain BE, some itching and redness BE.  Occasional floaters RE.  Vandana Atkinson COT 1:20 PM May 8, 2019

## 2019-05-08 NOTE — LETTER
May 8, 2019      Abel Nuñez  [address] [place] [id number]        To Whom It May Concern:    Abel Nuñez was seen in our eye clinic. He is being treated for an eye condition that causes eye strain and fatigue. He will need more frequent breaks from looking at his computer screen. Please provide appropriate accomodation for his condition over the next three months. Please let us know if you have any questions.     Sincerely,        Kaleb Avila MD  Ophthalmology Department   [facility]

## 2019-07-29 DIAGNOSIS — H33.051 RECENT RETINAL DETACHMENT OF RIGHT EYE, TOTAL/SUBTOTAL: Primary | ICD-10-CM

## 2019-08-01 ENCOUNTER — OFFICE VISIT (OUTPATIENT)
Dept: OPHTHALMOLOGY | Facility: CLINIC | Age: 29
End: 2019-08-01
Attending: OPHTHALMOLOGY
Payer: COMMERCIAL

## 2019-08-01 DIAGNOSIS — H33.21 RETINAL DETACHMENT WITH PRESENCE OF SUBRETINAL FLUID, RIGHT: Primary | ICD-10-CM

## 2019-08-01 DIAGNOSIS — H33.21 RETINAL DETACHMENT WITH PRESENCE OF SUBRETINAL FLUID, RIGHT: ICD-10-CM

## 2019-08-01 PROCEDURE — G0463 HOSPITAL OUTPT CLINIC VISIT: HCPCS | Mod: ZF

## 2019-08-01 PROCEDURE — 92134 CPTRZ OPH DX IMG PST SGM RTA: CPT | Mod: ZF | Performed by: OPHTHALMOLOGY

## 2019-08-01 RX ORDER — CYCLOBENZAPRINE HCL 10 MG
10 TABLET ORAL PRN
COMMUNITY
Start: 2019-07-08

## 2019-08-01 RX ORDER — ONDANSETRON 4 MG/1
4 TABLET, FILM COATED ORAL
COMMUNITY
Start: 2019-07-31 | End: 2019-11-13

## 2019-08-01 ASSESSMENT — SLIT LAMP EXAM - LIDS
COMMENTS: NORMAL
COMMENTS: NORMAL

## 2019-08-01 ASSESSMENT — TONOMETRY
IOP_METHOD: TONOPEN
OS_IOP_MMHG: 14
OD_IOP_MMHG: 12

## 2019-08-01 ASSESSMENT — VISUAL ACUITY
METHOD: SNELLEN - LINEAR
CORRECTION_TYPE: CONTACTS
OD_CC+: -2
OS_CC: 20/20
OD_CC: 20/30
OS_CC+: -1

## 2019-08-01 ASSESSMENT — CONF VISUAL FIELD
OS_NORMAL: 1
OD_NORMAL: 1

## 2019-08-01 ASSESSMENT — ENCOUNTER SYMPTOMS: JOINT SWELLING: 1

## 2019-08-01 ASSESSMENT — EXTERNAL EXAM - RIGHT EYE: OD_EXAM: NORMAL

## 2019-08-01 ASSESSMENT — EXTERNAL EXAM - LEFT EYE: OS_EXAM: NORMAL

## 2019-08-01 NOTE — PROGRESS NOTES
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 Feels that vision is improving but slowly.    Retina attached  Scleral buckle good contour  Doing well  VA refracted to 20/40    OCULAR IMAGING  Optical Coherence Tomography  8-1-19  Right eye: subfoveal subretinal fluid, improved  Extramacular: shallow subretinal fluid, improving  Left eye: good foveal contour; small cystic change    PHOTOS 1-23-19  Right eye: consistent with exam. scleral buckle good height; peripheral laser scars    Autofluorescence 01/23/19 : inferior areas of hypoautofluorescence; inf demarcation line hyperautofluorescent    Assessment and plan  1. History of Retinal detachment  Right eye   status post SB (240/276/70)/Cryo/SRF drainage OD and ABBE OS (12/10/18)   Retina attached  Macular subretinal fluid improving     plan  Follow up in 4 months with Optical Coherence Tomography and inferior and temporal extramacular Optical Coherence Tomography images. And optos both eyes   And prescription   And CL toby   artificial tears  As needed     ~~~~~~~~~~~~~~~~~~~~~~~~~~~~~~~~~~   Complete documentation of historical and exam elements from today's encounter can be found in the full encounter summary report (not reduplicated in this progress note).  I personally obtained the chief complaint(s) and history of present illness.  I confirmed and edited as necessary the review of systems, past medical/surgical history, family history, social history, and examination findings as documented by others; and I examined the patient myself.  I personally reviewed the relevant tests, images, and reports as documented above.  I personally reviewed the ophthalmic test(s) associated with this encounter, agree with the interpretation(s) as documented by the resident/fellow, and have edited the corresponding report(s) as necessary.   I formulated and  edited as necessary the assessment and plan and discussed the findings and management plan with the patient and family    Nicol Chavarria MD   of Ophthalmology.  Retina Service   Department of Ophthalmology and Visual Neurosciences   [facility]  Phone: [phone]   Fax: [phone]

## 2019-08-01 NOTE — NURSING NOTE
Chief Complaint(s) and History of Present Illness(es)     Follow Up     In right eye.  Associated symptoms include dryness, redness and floaters.  Negative for eye pain, tearing and flashes.              Comments     Pt is here for a 3 month f/u for History of Retinal detachment  Right eye. Pt notes vision in RE is getting better. Pt notes intermittent dryness and redness BE x last few months, usually more dry and red in the evening. Pt c/o some straining of the LE since Dec after some lasering of the LE. Pt notes no changes in floaters.     Winter Aden, COMT 1:13 PM August 1, 2019

## 2019-09-16 ENCOUNTER — MYC MEDICAL ADVICE (OUTPATIENT)
Dept: OPHTHALMOLOGY | Facility: CLINIC | Age: 29
End: 2019-09-16

## 2019-09-17 PROBLEM — Z86.69 HISTORY OF RETINAL DETACHMENT: Status: ACTIVE | Noted: 2018-12-18

## 2019-09-17 PROBLEM — H52.13 MYOPIA OF BOTH EYES: Status: ACTIVE | Noted: 2019-05-03

## 2019-09-17 PROBLEM — H33.323 RETINAL HOLE OF BOTH EYES: Status: ACTIVE | Noted: 2019-01-08

## 2019-09-17 PROBLEM — H52.223 REGULAR ASTIGMATISM OF BOTH EYES: Status: ACTIVE | Noted: 2019-05-03

## 2019-09-17 PROBLEM — H35.413 LATTICE DEGENERATION OF RETINA, BILATERAL: Status: ACTIVE | Noted: 2018-12-18

## 2019-11-13 ENCOUNTER — OFFICE VISIT (OUTPATIENT)
Dept: OPHTHALMOLOGY | Facility: CLINIC | Age: 29
End: 2019-11-13
Attending: OPHTHALMOLOGY
Payer: COMMERCIAL

## 2019-11-13 ENCOUNTER — OFFICE VISIT (OUTPATIENT)
Dept: OPTOMETRY | Facility: CLINIC | Age: 29
End: 2019-11-13
Payer: COMMERCIAL

## 2019-11-13 DIAGNOSIS — H52.223 REGULAR ASTIGMATISM OF BOTH EYES: ICD-10-CM

## 2019-11-13 DIAGNOSIS — H52.13 MYOPIA, HIGH, BILATERAL: Primary | ICD-10-CM

## 2019-11-13 DIAGNOSIS — H33.051 RECENT RETINAL DETACHMENT OF RIGHT EYE, TOTAL/SUBTOTAL: ICD-10-CM

## 2019-11-13 DIAGNOSIS — H33.8 OLD RETINAL DETACHMENT, PARTIAL: Primary | ICD-10-CM

## 2019-11-13 PROCEDURE — 92250 FUNDUS PHOTOGRAPHY W/I&R: CPT | Mod: ZF | Performed by: OPHTHALMOLOGY

## 2019-11-13 PROCEDURE — 92134 CPTRZ OPH DX IMG PST SGM RTA: CPT | Mod: ZF | Performed by: OPHTHALMOLOGY

## 2019-11-13 PROCEDURE — G0463 HOSPITAL OUTPT CLINIC VISIT: HCPCS | Mod: ZF

## 2019-11-13 ASSESSMENT — REFRACTION_WEARINGRX
OS_CYLINDER: +1.25
OD_SPHERE: -14.00
OS_SPHERE: -10.75
OS_AXIS: 095
OD_CYLINDER: +2.50
OD_AXIS: 095
OS_AXIS: 095
OS_CYLINDER: +1.25
OD_AXIS: 095
OS_SPHERE: -10.75
OD_SPHERE: -14.00
OD_CYLINDER: +2.50

## 2019-11-13 ASSESSMENT — EXTERNAL EXAM - RIGHT EYE
OD_EXAM: NORMAL
OD_EXAM: NORMAL

## 2019-11-13 ASSESSMENT — CONF VISUAL FIELD
OD_NORMAL: 1
METHOD: COUNTING FINGERS
OS_NORMAL: 1

## 2019-11-13 ASSESSMENT — SLIT LAMP EXAM - LIDS
COMMENTS: NORMAL

## 2019-11-13 ASSESSMENT — REFRACTION_CURRENTRX
OD_SPHERE: -9.00
OD_DIAMETER: 14.5
OS_SPHERE: -8.00
OD_SPHERE: -9.00
OS_BRAND: ACUVUE OASYS ASTIGMATISM
OD_AXIS: 180
OD_AXIS: 010
OD_BASECURVE: 8.6
OS_BASECURVE: 8.6
OD_SPHERE: -9.00
OS_DIAMETER: 14.5
OS_AXIS: 180
OD_BRAND: AV OASYS ASTIG
OD_CYLINDER: -1.75
OS_CYLINDER: -1.25
OD_CYLINDER: -1.75
OD_BRAND: ACUVUE OASYS ASTIGMATISM
OS_SPHERE: -7.50

## 2019-11-13 ASSESSMENT — REFRACTION_MANIFEST
OS_AXIS: 090
OS_SPHERE: -10.25
OS_CYLINDER: +1.50
OD_CYLINDER: +3.25
OD_SPHERE: -13.50
OD_AXIS: 100

## 2019-11-13 ASSESSMENT — VISUAL ACUITY
OS_CC: 20/20
METHOD: SNELLEN - LINEAR
OD_CC: 20/30-1
OD_CC: 20/30
CORRECTION_TYPE: CONTACTS
OS_CC: 20/20-1
METHOD: SNELLEN - LINEAR
CORRECTION_TYPE: CONTACTS
OD_CC+: -1
OS_CC+: -1

## 2019-11-13 ASSESSMENT — TONOMETRY
OD_IOP_MMHG: 12
OS_IOP_MMHG: 14
IOP_METHOD: TONOPEN

## 2019-11-13 ASSESSMENT — EXTERNAL EXAM - LEFT EYE
OS_EXAM: NORMAL
OS_EXAM: NORMAL

## 2019-11-13 NOTE — PROGRESS NOTES
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 Feels that vision is improving but slowly.    Retina attached  Scleral buckle good contour  Doing well  VA refracted to 20/40    OCULAR IMAGING  Optical Coherence Tomography  11-13-19  Right eye: subfoveal subretinal fluid, resolved  Extramacular: shallow subretinal fluid, improving  Left eye: good foveal contour; small cystic change    PHOTOS 11-13-19  Right eye: consistent with exam. scleral buckle good height; peripheral laser scars    Autofluorescence 01/23/19 : inferior areas of hypoautofluorescence; inf demarcation line hyperautofluorescent    Assessment and plan  1. History of Retinal detachment  Right eye   status post SB (240/276/70)/Cryo/SRF drainage OD and ABBE OS (12/10/18)   Retina attached.   Macular subretinal fluid resolved    plan  Follow up in 9 months with Optical Coherence Tomography and inferior and temporal extramacular Optical Coherence Tomography images. And optos both eyes     artificial tears  As needed     ~~~~~~~~~~~~~~~~~~~~~~~~~~~~~~~~~~   Complete documentation of historical and exam elements from today's encounter can be found in the full encounter summary report (not reduplicated in this progress note).  I personally obtained the chief complaint(s) and history of present illness.  I confirmed and edited as necessary the review of systems, past medical/surgical history, family history, social history, and examination findings as documented by others; and I examined the patient myself.  I personally reviewed the relevant tests, images, and reports as documented above.  I personally reviewed the ophthalmic test(s) associated with this encounter, agree with the interpretation(s) as documented by the resident/fellow, and have edited the corresponding report(s) as necessary.   I formulated and edited as necessary the  assessment and plan and discussed the findings and management plan with the patient and family    Nicol Chavarria MD   of Ophthalmology.  Retina Service   Department of Ophthalmology and Visual Neurosciences   [facility]  Phone: [phone]   Fax: [phone]

## 2019-11-13 NOTE — NURSING NOTE
Chief Complaints and History of Present Illnesses   Patient presents with     Follow Up     3 month follow up History of Retinal detachment  Right eye     Chief Complaint(s) and History of Present Illness(es)     Follow Up     Comments: 3 month follow up History of Retinal detachment  Right eye              Comments     Pt saw Dr guerrero earlier today for glasses and contacts prescription.  Pt states vision has improved slightly since last visit. No eye pain today.  No new flashes or floaters.     TREVIN Nath  November 13, 2019 11:11 AM

## 2019-11-13 NOTE — PROGRESS NOTES
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 Chavarria for retinal care today

## 2020-02-03 ENCOUNTER — TELEPHONE (OUTPATIENT)
Dept: OPHTHALMOLOGY | Facility: CLINIC | Age: 30
End: 2020-02-03
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
## 2020-02-07 ENCOUNTER — MYC MEDICAL ADVICE (OUTPATIENT)
Dept: OPHTHALMOLOGY | Facility: CLINIC | Age: 30
End: 2020-02-07

## 2020-02-07 ENCOUNTER — TELEPHONE (OUTPATIENT)
Dept: OPHTHALMOLOGY | Facility: CLINIC | Age: 30
End: 2020-02-07
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
## 2020-02-08 ENCOUNTER — OFFICE VISIT (OUTPATIENT)
Dept: OPHTHALMOLOGY | Facility: CLINIC | Age: 30
End: 2020-02-08

## 2020-02-08 DIAGNOSIS — H33.051 RECENT RETINAL DETACHMENT OF RIGHT EYE, TOTAL/SUBTOTAL: Primary | ICD-10-CM

## 2020-02-08 DIAGNOSIS — H43.391 VITREOUS SYNERESIS OF RIGHT EYE: ICD-10-CM

## 2020-02-08 DIAGNOSIS — H31.001 RETINAL SCAR OF RIGHT EYE: ICD-10-CM

## 2020-02-08 DIAGNOSIS — H33.8 OLD RETINAL DETACHMENT, PARTIAL: ICD-10-CM

## 2020-02-08 ASSESSMENT — EXTERNAL EXAM - LEFT EYE: OS_EXAM: NORMAL

## 2020-02-08 ASSESSMENT — TONOMETRY
OD_IOP_MMHG: 11
OS_IOP_MMHG: 17
IOP_METHOD: ICARE

## 2020-02-08 ASSESSMENT — CONF VISUAL FIELD
OS_NORMAL: 1
OD_NORMAL: 1
METHOD: COUNTING FINGERS

## 2020-02-08 ASSESSMENT — SLIT LAMP EXAM - LIDS
COMMENTS: NORMAL
COMMENTS: NORMAL

## 2020-02-08 ASSESSMENT — EXTERNAL EXAM - RIGHT EYE: OD_EXAM: NORMAL

## 2020-02-08 ASSESSMENT — VISUAL ACUITY
METHOD: SNELLEN - LINEAR
OS_CC: 20/20
CORRECTION_TYPE: GLASSES
OS_CC+: -1
OD_CC+: +
OD_CC: 20/30
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
## 2020-03-09 DIAGNOSIS — H33.8 OLD RETINAL DETACHMENT, PARTIAL: Primary | ICD-10-CM

## 2020-03-11 ENCOUNTER — HEALTH MAINTENANCE LETTER (OUTPATIENT)
Age: 30
End: 2020-03-11

## 2020-03-12 ENCOUNTER — OFFICE VISIT (OUTPATIENT)
Dept: OPHTHALMOLOGY | Facility: CLINIC | Age: 30
End: 2020-03-12
Attending: OPHTHALMOLOGY
Payer: COMMERCIAL

## 2020-03-12 DIAGNOSIS — H33.8 OLD RETINAL DETACHMENT, PARTIAL: Primary | ICD-10-CM

## 2020-03-12 PROCEDURE — 92134 CPTRZ OPH DX IMG PST SGM RTA: CPT | Mod: ZF | Performed by: OPHTHALMOLOGY

## 2020-03-12 PROCEDURE — 92250 FUNDUS PHOTOGRAPHY W/I&R: CPT | Mod: ZF | Performed by: OPHTHALMOLOGY

## 2020-03-12 PROCEDURE — G0463 HOSPITAL OUTPT CLINIC VISIT: HCPCS | Mod: ZF

## 2020-03-12 ASSESSMENT — VISUAL ACUITY
OD_CC: 20/30
CORRECTION_TYPE: CONTACTS
METHOD: SNELLEN - LINEAR
OD_CC+: -2
OS_CC: 20/20

## 2020-03-12 ASSESSMENT — EXTERNAL EXAM - LEFT EYE: OS_EXAM: NORMAL

## 2020-03-12 ASSESSMENT — CONF VISUAL FIELD
OS_NORMAL: 1
OD_NORMAL: 1
METHOD: COUNTING FINGERS

## 2020-03-12 ASSESSMENT — TONOMETRY
OD_IOP_MMHG: 09
OS_IOP_MMHG: 11
IOP_METHOD: TONOPEN

## 2020-03-12 ASSESSMENT — SLIT LAMP EXAM - LIDS
COMMENTS: NORMAL
COMMENTS: NORMAL

## 2020-03-12 ASSESSMENT — REFRACTION_WEARINGRX
OS_CYLINDER: +1.25
OD_CYLINDER: +2.50
OS_AXIS: 095
OS_SPHERE: -10.75
OD_SPHERE: -14.00
OD_AXIS: 095

## 2020-03-12 ASSESSMENT — EXTERNAL EXAM - RIGHT EYE: OD_EXAM: NORMAL

## 2020-03-12 NOTE — PROGRESS NOTES
CC -  Floaters right eye    INTERVAL HISTORY - Initial visit with me    HPI -  Abel Nuñez is a  29 year old year-old patient presenting for right eye floaters after a trauma to face (right cheek) last week. He has hx of RD right eye s/p SB as below. No change in vision. Not sure if floaters are new. No flashing. Left eye is good with no symptoms.     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 Feels that vision is improving but slowly.    RETIN/AL IMAGING:  OCT 3/12/2020  OD - macula attached with no SRF; vitreous debris no change compared to previous; SRF inferior retina behind the buckle    ASSESSMENT & PLAN    Old retinal detachment; right eye   Status post SB (240/276/70)/Cryo/SRF drainage OD and ABBE OS (12/10/18)    Retina attached. Buckle with good height; small peripheral SRF anterior to buckle with no extension to macula (stable compared to previous note)   No macular subretinal fluid or ERM   Continue to monitor   Retina detachment precautions were discussed with the patient (presence or increased in flashes, floaters or a curtain in the visual field) and was asked to return if any of the those occur      follow up in 4 months with Optical Coherence Tomography; optos and autofluorescence; sooner as needed     Indra Drew MD  Vitreoretinal surgery fellow  [facility]    ~~~~~~~~~~~~~~~~~~~~~~~~~~~~~~~~~~   Complete documentation of historical and exam elements from today's encounter can be found in the full encounter summary report (not reduplicated in this progress note).  I personally obtained the chief complaint(s) and history of present illness.  I confirmed and edited as necessary the review of systems, past medical/surgical history, family history, social history, and examination findings as documented by others; and I examined the patient myself.  I personally reviewed the relevant  tests, images, and reports as documented above.  I personally reviewed the ophthalmic test(s) associated with this encounter, agree with the interpretation(s) as documented by the resident/fellow, and have edited the corresponding report(s) as necessary.   I formulated and edited as necessary the assessment and plan and discussed the findings and management plan with the patient and family    Nicol Chavarria MD   of Ophthalmology.  Retina Service   Department of Ophthalmology and Visual Neurosciences   [facility]  Phone: [phone]   Fax: [phone]

## 2020-03-12 NOTE — LETTER
March 12, 2020      Re: Abel Nuñez   1990    To Whom It May Concern:    This is to confirm that the above patient was seen on 3/12/2020.  Abel Nuñez is able to return to work Monday March 16, 2020 with restrictions of any activities that might involve facial trauma.    Thank you for your cooperation in this matter.  Please do not hesitate to contact me if you have any further questions.    Sincerely,    Electronically signed  STELLA FLOREZ

## 2020-03-12 NOTE — NURSING NOTE
Chief Complaints and History of Present Illnesses   Patient presents with     Follow Up     Chief Complaint(s) and History of Present Illness(es)     Follow Up     Laterality: both eyes    Associated symptoms: floaters.  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  The patient states he has no vision changes.  The patient notes he has unchanged floaters.  Louts Caruso, COA, COA 1:18 PM 03/12/2020

## 2020-08-11 DIAGNOSIS — H33.8 OLD RETINAL DETACHMENT, PARTIAL: Primary | ICD-10-CM

## 2020-08-13 ENCOUNTER — OFFICE VISIT (OUTPATIENT)
Dept: OPHTHALMOLOGY | Facility: CLINIC | Age: 30
End: 2020-08-13
Attending: OPHTHALMOLOGY
Payer: COMMERCIAL

## 2020-08-13 DIAGNOSIS — H33.8 OLD RETINAL DETACHMENT, PARTIAL: ICD-10-CM

## 2020-08-13 PROCEDURE — 92134 CPTRZ OPH DX IMG PST SGM RTA: CPT | Mod: ZF | Performed by: OPHTHALMOLOGY

## 2020-08-13 PROCEDURE — 92250 FUNDUS PHOTOGRAPHY W/I&R: CPT | Mod: ZF | Performed by: OPHTHALMOLOGY

## 2020-08-13 PROCEDURE — G0463 HOSPITAL OUTPT CLINIC VISIT: HCPCS | Mod: ZF

## 2020-08-13 ASSESSMENT — VISUAL ACUITY
OD_PH_CC: 20/30
CORRECTION_TYPE: CONTACTS
OS_CC+: +1
OD_CC: 20/40
OS_CC: 20/20
METHOD: SNELLEN - LINEAR

## 2020-08-13 ASSESSMENT — TONOMETRY
OS_IOP_MMHG: 14
IOP_METHOD: TONOPEN
OD_IOP_MMHG: 12

## 2020-08-13 ASSESSMENT — REFRACTION_WEARINGRX
OS_AXIS: 095
OD_AXIS: 095
OS_SPHERE: -10.75
OD_SPHERE: -14.00
OD_CYLINDER: +2.50
OS_CYLINDER: +1.25

## 2020-08-13 ASSESSMENT — SLIT LAMP EXAM - LIDS
COMMENTS: NORMAL
COMMENTS: NORMAL

## 2020-08-13 ASSESSMENT — CONF VISUAL FIELD
OD_NORMAL: 1
OS_NORMAL: 1
METHOD: COUNTING FINGERS

## 2020-08-13 ASSESSMENT — EXTERNAL EXAM - RIGHT EYE: OD_EXAM: NORMAL

## 2020-08-13 ASSESSMENT — EXTERNAL EXAM - LEFT EYE: OS_EXAM: NORMAL

## 2020-08-13 NOTE — PROGRESS NOTES
CC -  Floaters right eye and follow up scleral buckle right eye     INTERVAL HISTORY - Initial visit with me    HPI -  Abel Nuñez is a  29 year old year-old patient presenting for right eye floaters after a trauma to face (right cheek) last week. He has hx of RD right eye s/p SB as below. No change in vision. Not sure if floaters are new. No flashing. Left eye is good with no symptoms.     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 Feels that vision is improving but slowly.    RETIN/AL IMAGING:  OCT 8-13-20  OD - macula attached with no SRF; vitreous debris no change compared to previous; SRF inferior retina behind the buckle    AF 8-13-20  right eye: peripheral areas of hypoautofluorescence   left eye: normal     PHOTOS 8-13-20 consistent with exam      ASSESSMENT & PLAN    1. Old retinal detachment; right eye   Status post SB (240/276/70)/Cryo/SRF drainage OD and ABBE OS (12/10/18)    Retina attached. Buckle with good height;    small peripheral SRF anterior to buckle with no extension to macula (appears to improved compared to previous note)   No macular subretinal fluid or ERM   Continue to monitor; reports improvement of floaters   Retina detachment precautions were discussed with the patient (presence or increased in flashes, floaters or a curtain in the visual field) and was asked to return if any of the those occur      follow up in 12 months with Optical Coherence Tomography; optos and autofluorescence; sooner as needed     ~~~~~~~~~~~~~~~~~~~~~~~~~~~~~~~~~~   Complete documentation of historical and exam elements from today's encounter can be found in the full encounter summary report (not reduplicated in this progress note).  I personally obtained the chief complaint(s) and history of present illness.  I confirmed and edited as necessary the review of systems, past medical/surgical history, family  history, social history, and examination findings as documented by others; and I examined the patient myself.  I personally reviewed the relevant tests, images, and reports as documented above.  I formulated and edited as necessary the assessment and plan and discussed the findings and management plan with the patient and family    Nicol Chavarria MD   of Ophthalmology.  Retina Service   Department of Ophthalmology and Visual Neurosciences   [facility]  Phone: [phone]   Fax: [phone]

## 2020-08-13 NOTE — LETTER
8/13/2020       RE: Abel Nuñez  [address] [place] [id number]     Dear Colleague,    Thank you for referring your patient, Abel Nuñez, to the EYE CLINIC at [facility]. Please see a copy of my visit note below.    CC -  Floaters right eye and follow up scleral buckle right eye     INTERVAL HISTORY - Initial visit with me    HPI -  Abel Nuñez is a  29 year old year-old patient presenting for right eye floaters after a trauma to face (right cheek) last week. He has hx of RD right eye s/p SB as below. No change in vision. Not sure if floaters are new. No flashing. Left eye is good with no symptoms.     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 Feels that vision is improving but slowly.    RETIN/AL IMAGING:  OCT 8-13-20  OD - macula attached with no SRF; vitreous debris no change compared to previous; SRF inferior retina behind the buckle    AF 8-13-20  right eye: peripheral areas of hypoautofluorescence   left eye: normal     PHOTOS 8-13-20 consistent with exam      ASSESSMENT & PLAN    1. Old retinal detachment; right eye   Status post SB (240/276/70)/Cryo/SRF drainage OD and ABBE OS (12/10/18)    Retina attached. Buckle with good height;    small peripheral SRF anterior to buckle with no extension to macula (appears to improved compared to previous note)   No macular subretinal fluid or ERM   Continue to monitor; reports improvement of floaters   Retina detachment precautions were discussed with the patient (presence or increased in flashes, floaters or a curtain in the visual field) and was asked to return if any of the those occur      follow up in 12 months with Optical Coherence Tomography; optos and autofluorescence; sooner as needed     ~~~~~~~~~~~~~~~~~~~~~~~~~~~~~~~~~~  Complete documentation of historical and exam elements from today's encounter can  be found in the full encounter summary report (not reduplicated in this progress note).  I personally obtained the chief complaint(s) and history of present illness.  I confirmed and edited as necessary the review of systems, past medical/surgical history, family history, social history, and examination findings as documented by others; and I examined the patient myself.  I personally reviewed the relevant tests, images, and reports as documented above.  I formulated and edited as necessary the assessment and plan and discussed the findings and management plan with the patient and family. Nicol Chavarria MD    Again, thank you for allowing me to participate in the care of your patient.      Sincerely,    Nicol Chavarria M.D.   of Ophthalmology  Vitreoretinal Surgeon  [facility] Chair  Department of Ophthalmology & Visual Neurosciences  [facility]  Phone:  [phone]   Fax:  [phone]

## 2020-12-08 NOTE — ANESTHESIA CARE TRANSFER NOTE
Patient: Abel Nuñez    Procedure(s):  Right Eye Scleral Buckle, cryo, subretinal drainage  Left Retinopexy photocoagulation with binocular indirect ophthalmoscope    Diagnosis: Retinal Detachment  Diagnosis Additional Information: No value filed.    Anesthesia Type:   No value filed.     Note:  Airway :Face Mask  Patient transferred to:PACU  Comments: VSS and WNL, comfortable, no PONV, report to Amy CELESTEHandoff Report: Identifed the Patient, Identified the Reponsible Provider, Reviewed the pertinent medical history, Discussed the surgical course, Reviewed Intra-OP anesthesia mangement and issues during anesthesia, Set expectations for post-procedure period and Allowed opportunity for questions and acknowledgement of understanding      Vitals: (Last set prior to Anesthesia Care Transfer)    CRNA VITALS  12/10/2018 1221 - 12/10/2018 1257      12/10/2018             Pulse:  39  (Abnormal)     SpO2:  98 %    Resp Rate (observed):  12                Electronically Signed By: FLOR Byrd CRNA  December 10, 2018  12:57 PM   Detail Level: Zone Plan: Patient will call for an appoinment if spot does not resolve on it's own.

## 2021-01-03 ENCOUNTER — HEALTH MAINTENANCE LETTER (OUTPATIENT)
Age: 31
End: 2021-01-03

## 2021-02-22 NOTE — TELEPHONE ENCOUNTER
Spoke to pt at 0825  Dr. Albrecht also spoke to pt last night    Reviewed able to see here for exam if would like to come in earlier for assurrance.    No noted eye pain or vision changes    Encouraging no eye pain or vision changes and reviewed with pt to call for any changes    Pt verbally demonstrated understanding and has main hospital number to call to page eye doctor on call after hours/weekend    Malcom Adames RN 8:27 AM 02/04/20          [facility]    Phone Message    May a detailed message be left on voicemail: yes     Reason for Call: Other: Pt calling in had surgery previously and got hit in the eye today he is wondering if there are any symptoms he should look out for. please call bcak to discuss      Action Taken: Message routed to:  [facility]     Travel Screening: Not Applicable                                                                       98

## 2021-04-25 ENCOUNTER — HEALTH MAINTENANCE LETTER (OUTPATIENT)
Age: 31
End: 2021-04-25

## 2021-05-12 DIAGNOSIS — H33.8 OLD RETINAL DETACHMENT, PARTIAL: Primary | ICD-10-CM

## 2021-05-20 ENCOUNTER — OFFICE VISIT (OUTPATIENT)
Dept: OPHTHALMOLOGY | Facility: CLINIC | Age: 31
End: 2021-05-20
Attending: OPHTHALMOLOGY
Payer: COMMERCIAL

## 2021-05-20 DIAGNOSIS — H33.8 OLD RETINAL DETACHMENT, PARTIAL: ICD-10-CM

## 2021-05-20 PROCEDURE — 92250 FUNDUS PHOTOGRAPHY W/I&R: CPT | Performed by: OPHTHALMOLOGY

## 2021-05-20 PROCEDURE — G0463 HOSPITAL OUTPT CLINIC VISIT: HCPCS

## 2021-05-20 PROCEDURE — 92134 CPTRZ OPH DX IMG PST SGM RTA: CPT | Performed by: OPHTHALMOLOGY

## 2021-05-20 PROCEDURE — 99207 FUNDUS AUTOFLUORESCENCE IMAGE (FAF) OU (BOTH EYES): CPT | Performed by: OPHTHALMOLOGY

## 2021-05-20 PROCEDURE — 92012 INTRM OPH EXAM EST PATIENT: CPT | Performed by: OPHTHALMOLOGY

## 2021-05-20 ASSESSMENT — REFRACTION_WEARINGRX
OS_CYLINDER: +1.25
OD_CYLINDER: +2.50
OS_AXIS: 095
OD_AXIS: 095
OD_SPHERE: -14.00
OS_SPHERE: -10.75

## 2021-05-20 ASSESSMENT — CONF VISUAL FIELD
OS_NORMAL: 1
METHOD: COUNTING FINGERS

## 2021-05-20 ASSESSMENT — VISUAL ACUITY
OD_PH_CC+: -1
OS_CC: 20/20
OD_CC: 20/30
OD_PH_CC: 20/25
CORRECTION_TYPE: CONTACTS
OD_CC+: -2
METHOD: SNELLEN - LINEAR
OS_CC+: -2

## 2021-05-20 ASSESSMENT — TONOMETRY
OD_IOP_MMHG: 14
OS_IOP_MMHG: 17
IOP_METHOD: TONOPEN

## 2021-05-20 ASSESSMENT — EXTERNAL EXAM - LEFT EYE: OS_EXAM: NORMAL

## 2021-05-20 ASSESSMENT — SLIT LAMP EXAM - LIDS
COMMENTS: NORMAL
COMMENTS: NORMAL

## 2021-05-20 ASSESSMENT — EXTERNAL EXAM - RIGHT EYE: OD_EXAM: NORMAL

## 2021-05-20 NOTE — NURSING NOTE
Chief Complaints and History of Present Illnesses   Patient presents with     Retinal Detachment Follow Up     Chief Complaint(s) and History of Present Illness(es)     Retinal Detachment Follow Up     Laterality: both eyes    Associated symptoms: floaters (sometimes).  Negative for eye pain    Pain scale: 0/10              Comments     Abel is here to continue care for Old retinal detachment, partial. He feels both eyes seems fine now.     Reno Galindo COT 12:35 PM May 20, 2021

## 2021-05-20 NOTE — PROGRESS NOTES
CC -  follow up scleral buckle right eye     HPI -  Abel Nuñez is a  31 year old year-old patient presenting for right eye floaters after a trauma to face (right cheek) last week. He has hx of RD right eye s/p SB as below. No change in vision. Not sure if floaters are new. No flashing. Left eye is good with no symptoms.     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 Feels that vision is improving but slowly.    RETIN/AL IMAGING:  OCT 5-20-21  OD - macula attached with no SRF; good foveal contour    AF 5-20-21  right eye: peripheral areas of hypoautofluorescence over scleral buckle/ cryotherapy scars  left eye: normal     PHOTOS 5-20-21 consistent with exam    ASSESSMENT & PLAN    1. Old retinal detachment; right eye   Status post SB (240/276/70)/Cryo/SRF drainage OD and ABBE OS (12/10/18) SM   Retina attached. Buckle with good height;   Continue to monitor   Retina detachment precautions were discussed with the patient (presence or increased in flashes, floaters or a curtain in the visual field) and was asked to return if any of the those occur      follow up in 12 months; sooner as needed     ~~~~~~~~~~~~~~~~~~~~~~~~~~~~~~~~~~   Complete documentation of historical and exam elements from today's encounter can be found in the full encounter summary report (not reduplicated in this progress note).  I personally obtained the chief complaint(s) and history of present illness.  I confirmed and edited as necessary the review of systems, past medical/surgical history, family history, social history, and examination findings as documented by others; and I examined the patient myself.  I personally reviewed the relevant tests, images, and reports as documented above.  I formulated and edited as necessary the assessment and plan and discussed the findings and management plan with the patient and family    Nicol HIGGINBOTHAM  MD Aura   of Ophthalmology.  Retina Service   Department of Ophthalmology and Visual Neurosciences   [facility]  Phone: [phone]   Fax: [phone]

## 2021-10-10 ENCOUNTER — HEALTH MAINTENANCE LETTER (OUTPATIENT)
Age: 31
End: 2021-10-10

## 2022-01-07 NOTE — NURSING NOTE
----- Message from Ashlie London RN sent at 1/7/2022  8:24 AM CST -----  Regarding: FW: Question regarding 2019 NOVEL CORONAVIRUS (SARS-COV-2)  Please assist.   ----- Message -----  From: Mala Holden  Sent: 1/6/2022   5:43 PM CST  To: , #  Subject: Question regarding 2019 NOVEL CORONAVIRUS (S#    If you’re saying that my test is negative then how come the test says it detected Cov-2?     Chief Complaints and History of Present Illnesses   Patient presents with     Follow Up     5 month follow up S/p SB (240/276/70)/Cryo/SRF drainage OD and ABBE OS (12/10/18)      Chief Complaint(s) and History of Present Illness(es)     Follow Up     Comments: 5 month follow up S/p SB (240/276/70)/Cryo/SRF drainage OD and ABBE OS (12/10/18)               Comments     Pt states vision is getting better since last visit. Pt starting to feel eye strain in both eyes when reading up close.  No diplopia. Dryness in both eyes, relief with drops.  No new flashes or floaters.    TREVIN Nath August 13, 2020 12:00 PM

## 2022-05-19 DIAGNOSIS — H33.8 OLD RETINAL DETACHMENT, PARTIAL: Primary | ICD-10-CM

## 2022-05-21 ENCOUNTER — HEALTH MAINTENANCE LETTER (OUTPATIENT)
Age: 32
End: 2022-05-21

## 2022-05-26 ENCOUNTER — OFFICE VISIT (OUTPATIENT)
Dept: OPHTHALMOLOGY | Facility: CLINIC | Age: 32
End: 2022-05-26
Attending: OPHTHALMOLOGY
Payer: COMMERCIAL

## 2022-05-26 DIAGNOSIS — H33.8 OLD RETINAL DETACHMENT, PARTIAL: ICD-10-CM

## 2022-05-26 PROCEDURE — 92012 INTRM OPH EXAM EST PATIENT: CPT | Performed by: OPHTHALMOLOGY

## 2022-05-26 PROCEDURE — 92134 CPTRZ OPH DX IMG PST SGM RTA: CPT | Performed by: OPHTHALMOLOGY

## 2022-05-26 PROCEDURE — G0463 HOSPITAL OUTPT CLINIC VISIT: HCPCS | Mod: 25

## 2022-05-26 PROCEDURE — 99207 FUNDUS PHOTOS OU (BOTH EYES): CPT | Mod: 26 | Performed by: OPHTHALMOLOGY

## 2022-05-26 PROCEDURE — 92250 FUNDUS PHOTOGRAPHY W/I&R: CPT | Performed by: OPHTHALMOLOGY

## 2022-05-26 ASSESSMENT — VISUAL ACUITY
OD_CC: 20/40
OS_CC: 20/20
OD_PH_CC: 20/25
CORRECTION_TYPE: CONTACTS
METHOD: SNELLEN - LINEAR

## 2022-05-26 ASSESSMENT — TONOMETRY
IOP_METHOD: TONOPEN
OS_IOP_MMHG: 15
OD_IOP_MMHG: 12

## 2022-05-26 ASSESSMENT — REFRACTION_WEARINGRX
OD_SPHERE: -14.00
OS_SPHERE: -10.75
OS_CYLINDER: +1.25
OD_AXIS: 095
OD_CYLINDER: +2.50
OS_AXIS: 095

## 2022-05-26 ASSESSMENT — CONF VISUAL FIELD
OS_NORMAL: 1
OD_NORMAL: 1

## 2022-05-26 ASSESSMENT — SLIT LAMP EXAM - LIDS
COMMENTS: NORMAL
COMMENTS: NORMAL

## 2022-05-26 ASSESSMENT — EXTERNAL EXAM - LEFT EYE: OS_EXAM: NORMAL

## 2022-05-26 ASSESSMENT — EXTERNAL EXAM - RIGHT EYE: OD_EXAM: NORMAL

## 2022-05-26 NOTE — NURSING NOTE
Chief Complaints and History of Present Illnesses   Patient presents with     Retinal Detachment Follow Up     Chief Complaint(s) and History of Present Illness(es)     Retinal Detachment Follow Up     Associated symptoms: peripheral vision loss              Comments     Pt. States that he is doing well. No change in VA BE. Has had some itching BE. No new flashes or floaters BE.   Vandana Atkinson COT 1:09 PM May 26, 2022

## 2022-05-26 NOTE — PROGRESS NOTES
CC -  follow up scleral buckle surgery right eye     HPI -  Abel Nuñez is a  32 year old year-old patient presenting for right eye floaters after a trauma to face (right cheek) last week. He has hx of RD right eye s/p SB as below. No change in vision. Not sure if floaters are new. No flashing. Left eye is good with no symptoms.     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 Feels that vision is improving but slowly.    RETIN/AL IMAGING:  OCT 05/26/22   OD - macula attached with no SRF; good foveal contour    AF 05/26/22   right eye: peripheral areas of hypoautofluorescence over scleral buckle/ cryotherapy scars  left eye: normal     PHOTOS 05/26/22  consistent with exam    ASSESSMENT & PLAN    #. Old retinal detachment; right eye   Status post SB (240/276/70)/Cryo/SRF drainage OD and ABBE OS (12/10/18) SM   Retina attached. Buckle with good height;   Continue to monitor   Retina detachment precautions were discussed with the patient (presence or increased in flashes, floaters or a curtain in the visual field) and was asked to return if any of the those occur      follow up in 12 months; sooner as needed     ~~~~~~~~~~~~~~~~~~~~~~~~~~~~~~~~~~   Complete documentation of historical and exam elements from today's encounter can be found in the full encounter summary report (not reduplicated in this progress note).  I personally obtained the chief complaint(s) and history of present illness.  I confirmed and edited as necessary the review of systems, past medical/surgical history, family history, social history, and examination findings as documented by others; and I examined the patient myself.  I personally reviewed the relevant tests, images, and reports as documented above.  I formulated and edited as necessary the assessment and plan and discussed the findings and management plan with the patient and  family    Nicol Chavarria MD   of Ophthalmology.  Retina Service   Department of Ophthalmology and Visual Neurosciences   [facility]  Phone: [phone]   Fax: [phone]

## 2022-09-18 ENCOUNTER — HEALTH MAINTENANCE LETTER (OUTPATIENT)
Age: 32
End: 2022-09-18

## 2023-06-04 ENCOUNTER — HEALTH MAINTENANCE LETTER (OUTPATIENT)
Age: 33
End: 2023-06-04

## 2024-07-14 ENCOUNTER — HEALTH MAINTENANCE LETTER (OUTPATIENT)
Age: 34
End: 2024-07-14

## (undated) DEVICE — EYE PACK 25GA PLUS CONSTELLATION PPK4253

## (undated) DEVICE — TAPE MICROPORE 1"X1.5YD 1530S-1

## (undated) DEVICE — EYE SOL BSS 500ML BAG 0065-1795-04

## (undated) DEVICE — ESU CORD BIPOLAR GREEN 10-4000

## (undated) DEVICE — SU MERSILENE 5-0 S-24DA 18" 1761G

## (undated) DEVICE — TAPE MICROPORE 2"X1.5YD 1530S-2

## (undated) DEVICE — COVER CAMERA IN-LIGHT DISP LT-C02

## (undated) DEVICE — GLOVE PROTEXIS MICRO 6.0  2D73PM60

## (undated) DEVICE — SU MERSILENE 5-0 S-24 18" 1764G

## (undated) DEVICE — DRAPE MICRO 41X81"

## (undated) DEVICE — LINEN TOWEL PACK X5 5464

## (undated) DEVICE — PACK VITRECTOMY PQ15VI57E

## (undated) DEVICE — TUBING SUCTION 12"X1/4" N612

## (undated) DEVICE — LIGHT HANDLE X1 31140133

## (undated) DEVICE — EYE KIT AUTO GAS FOR CONSTELLATION 8065751014

## (undated) DEVICE — SU PLAIN 6-0 TG140-8 18" 1735G

## (undated) DEVICE — EYE NDL RETROBULBAR 25GA 1.5" 581275

## (undated) DEVICE — SU SILK 2-0 TIE 18 SA65H

## (undated) DEVICE — NDL 18GA 1.5" 305196

## (undated) DEVICE — EYE SHIELD PLASTIC

## (undated) DEVICE — SYR 01ML 27GA 0.5" ECLIPSE 305789

## (undated) DEVICE — SU VICRYL 7-0 TG140-8DA 18" J546G

## (undated) RX ORDER — PROPOFOL 10 MG/ML
INJECTION, EMULSION INTRAVENOUS
Status: DISPENSED
Start: 2018-12-10

## (undated) RX ORDER — EPHEDRINE SULFATE 50 MG/ML
INJECTION, SOLUTION INTRAMUSCULAR; INTRAVENOUS; SUBCUTANEOUS
Status: DISPENSED
Start: 2018-12-10

## (undated) RX ORDER — ACETAMINOPHEN 325 MG/1
TABLET ORAL
Status: DISPENSED
Start: 2018-12-10

## (undated) RX ORDER — GABAPENTIN 300 MG/1
CAPSULE ORAL
Status: DISPENSED
Start: 2018-12-10

## (undated) RX ORDER — FENTANYL CITRATE 50 UG/ML
INJECTION, SOLUTION INTRAMUSCULAR; INTRAVENOUS
Status: DISPENSED
Start: 2018-12-10